# Patient Record
Sex: MALE | Race: WHITE | Employment: FULL TIME | URBAN - NONMETROPOLITAN AREA
[De-identification: names, ages, dates, MRNs, and addresses within clinical notes are randomized per-mention and may not be internally consistent; named-entity substitution may affect disease eponyms.]

---

## 2017-08-07 ENCOUNTER — OFFICE VISIT (OUTPATIENT)
Dept: FAMILY MEDICINE CLINIC | Age: 47
End: 2017-08-07

## 2017-08-07 VITALS
TEMPERATURE: 97.2 F | OXYGEN SATURATION: 98 % | WEIGHT: 174 LBS | BODY MASS INDEX: 24.91 KG/M2 | HEART RATE: 83 BPM | HEIGHT: 70 IN | DIASTOLIC BLOOD PRESSURE: 102 MMHG | SYSTOLIC BLOOD PRESSURE: 146 MMHG

## 2017-08-07 DIAGNOSIS — F41.9 ANXIETY: Primary | ICD-10-CM

## 2017-08-07 DIAGNOSIS — I10 ESSENTIAL (PRIMARY) HYPERTENSION: ICD-10-CM

## 2017-08-07 DIAGNOSIS — Z91.030 ALLERGY TO BEE STING: ICD-10-CM

## 2017-08-07 DIAGNOSIS — F51.04 PSYCHOPHYSIOLOGICAL INSOMNIA: ICD-10-CM

## 2017-08-07 PROBLEM — G25.81 RESTLESS LEG: Status: ACTIVE | Noted: 2017-06-12

## 2017-08-07 PROBLEM — M54.50 ACUTE LOW BACK PAIN: Status: ACTIVE | Noted: 2017-07-11

## 2017-08-07 PROCEDURE — 99203 OFFICE O/P NEW LOW 30 MIN: CPT | Performed by: NURSE PRACTITIONER

## 2017-08-07 RX ORDER — CLONAZEPAM 1 MG/1
1 TABLET ORAL
Qty: 60 TABLET | Status: CANCELLED | OUTPATIENT
Start: 2017-08-07

## 2017-08-07 RX ORDER — LOSARTAN POTASSIUM 50 MG/1
50 TABLET ORAL DAILY
Qty: 30 TABLET | Refills: 3 | Status: SHIPPED | OUTPATIENT
Start: 2017-08-07 | End: 2017-12-11 | Stop reason: DRUGHIGH

## 2017-08-07 RX ORDER — PROPRANOLOL HYDROCHLORIDE 80 MG/1
80 CAPSULE, EXTENDED RELEASE ORAL
COMMUNITY
Start: 2017-06-05 | End: 2017-08-07 | Stop reason: SDUPTHER

## 2017-08-07 RX ORDER — LOSARTAN POTASSIUM 50 MG/1
50 TABLET ORAL
COMMUNITY
Start: 2017-06-05 | End: 2017-08-07 | Stop reason: SDUPTHER

## 2017-08-07 RX ORDER — PROPRANOLOL HYDROCHLORIDE 80 MG/1
80 CAPSULE, EXTENDED RELEASE ORAL DAILY
Qty: 30 CAPSULE | Refills: 3 | Status: SHIPPED | OUTPATIENT
Start: 2017-08-07

## 2017-08-07 RX ORDER — ESZOPICLONE 3 MG/1
3 TABLET, FILM COATED ORAL NIGHTLY PRN
Qty: 30 TABLET | Refills: 0 | Status: SHIPPED | OUTPATIENT
Start: 2017-08-07 | End: 2017-09-01 | Stop reason: SDUPTHER

## 2017-08-07 RX ORDER — CLONAZEPAM 1 MG/1
1 TABLET ORAL
COMMUNITY
Start: 2017-07-05 | End: 2017-08-14 | Stop reason: SDUPTHER

## 2017-08-07 RX ORDER — EPINEPHRINE 0.3 MG/.3ML
0.3 INJECTION SUBCUTANEOUS PRN
COMMUNITY
End: 2017-08-07 | Stop reason: SDUPTHER

## 2017-08-07 RX ORDER — EPINEPHRINE 0.3 MG/.3ML
0.3 INJECTION SUBCUTANEOUS PRN
Qty: 3 EACH | Refills: 0 | Status: SHIPPED | OUTPATIENT
Start: 2017-08-07

## 2017-08-07 ASSESSMENT — ENCOUNTER SYMPTOMS
EYE DISCHARGE: 0
SHORTNESS OF BREATH: 0
COUGH: 0
PHOTOPHOBIA: 1

## 2017-08-14 RX ORDER — CLONAZEPAM 1 MG/1
1 TABLET ORAL 2 TIMES DAILY PRN
Qty: 60 TABLET | Refills: 0 | Status: SHIPPED | OUTPATIENT
Start: 2017-08-14 | End: 2017-09-18 | Stop reason: SDUPTHER

## 2017-09-01 DIAGNOSIS — F51.04 PSYCHOPHYSIOLOGICAL INSOMNIA: ICD-10-CM

## 2017-09-05 RX ORDER — ESZOPICLONE 3 MG/1
3 TABLET, FILM COATED ORAL NIGHTLY PRN
Qty: 30 TABLET | Refills: 0 | Status: SHIPPED | OUTPATIENT
Start: 2017-09-05 | End: 2017-10-06 | Stop reason: SDUPTHER

## 2017-09-11 ENCOUNTER — OFFICE VISIT (OUTPATIENT)
Dept: FAMILY MEDICINE CLINIC | Age: 47
End: 2017-09-11

## 2017-09-11 VITALS
BODY MASS INDEX: 23.66 KG/M2 | DIASTOLIC BLOOD PRESSURE: 80 MMHG | OXYGEN SATURATION: 99 % | HEART RATE: 74 BPM | SYSTOLIC BLOOD PRESSURE: 132 MMHG | WEIGHT: 169 LBS | HEIGHT: 71 IN | TEMPERATURE: 97.7 F

## 2017-09-11 DIAGNOSIS — F41.9 ANXIETY: ICD-10-CM

## 2017-09-11 DIAGNOSIS — I10 ESSENTIAL (PRIMARY) HYPERTENSION: ICD-10-CM

## 2017-09-11 DIAGNOSIS — H93.13 TINNITUS, BILATERAL: Primary | ICD-10-CM

## 2017-09-11 PROCEDURE — 99213 OFFICE O/P EST LOW 20 MIN: CPT | Performed by: NURSE PRACTITIONER

## 2017-09-11 ASSESSMENT — ENCOUNTER SYMPTOMS
COUGH: 0
SHORTNESS OF BREATH: 0

## 2017-09-18 DIAGNOSIS — F41.9 ANXIETY: Primary | ICD-10-CM

## 2017-09-18 RX ORDER — CLONAZEPAM 1 MG/1
1 TABLET ORAL 2 TIMES DAILY PRN
Qty: 60 TABLET | Refills: 0 | Status: SHIPPED | OUTPATIENT
Start: 2017-09-18 | End: 2017-10-10 | Stop reason: SDUPTHER

## 2017-09-23 ENCOUNTER — HOSPITAL ENCOUNTER (OUTPATIENT)
Dept: GENERAL RADIOLOGY | Age: 47
Discharge: HOME | End: 2017-09-23
Payer: COMMERCIAL

## 2017-09-23 DIAGNOSIS — R05.9 COUGH: ICD-10-CM

## 2017-09-23 PROCEDURE — 71020 XR CHEST STANDARD TWO VW: CPT

## 2017-10-06 DIAGNOSIS — F51.04 PSYCHOPHYSIOLOGICAL INSOMNIA: ICD-10-CM

## 2017-10-06 RX ORDER — ESZOPICLONE 3 MG/1
3 TABLET, FILM COATED ORAL NIGHTLY PRN
Qty: 30 TABLET | Refills: 0 | Status: SHIPPED | OUTPATIENT
Start: 2017-10-06 | End: 2017-11-03 | Stop reason: SDUPTHER

## 2017-10-10 DIAGNOSIS — F41.9 ANXIETY: ICD-10-CM

## 2017-10-10 RX ORDER — CLONAZEPAM 1 MG/1
1 TABLET ORAL 2 TIMES DAILY PRN
Qty: 60 TABLET | Refills: 0 | Status: SHIPPED | OUTPATIENT
Start: 2017-10-10 | End: 2017-11-10 | Stop reason: SDUPTHER

## 2017-10-10 NOTE — TELEPHONE ENCOUNTER
Controlled Substances Monitoring:     Attestation: The Prescription Monitoring Report for this patient was reviewed today. (Lucio Sandoval, CNP)  Documentation: No signs of potential drug abuse or diversion identified. (Lucio Sandoval, CNP)    Remind patient that I will only provide with 1 more fill after this one. Needs to see ENT or psych for further fills.

## 2017-11-03 DIAGNOSIS — F51.04 PSYCHOPHYSIOLOGICAL INSOMNIA: ICD-10-CM

## 2017-11-03 RX ORDER — ESZOPICLONE 3 MG/1
3 TABLET, FILM COATED ORAL NIGHTLY PRN
Qty: 30 TABLET | Refills: 0 | Status: SHIPPED | OUTPATIENT
Start: 2017-11-03 | End: 2017-12-04 | Stop reason: SDUPTHER

## 2017-11-10 DIAGNOSIS — F41.9 ANXIETY: ICD-10-CM

## 2017-11-10 RX ORDER — CLONAZEPAM 1 MG/1
1 TABLET ORAL 2 TIMES DAILY PRN
Qty: 60 TABLET | Refills: 0 | Status: SHIPPED | OUTPATIENT
Start: 2017-11-10

## 2017-11-10 NOTE — TELEPHONE ENCOUNTER
Ordered. As per our previous discussions, this will be the last prescription refill I provide for the klonopin as he was referred to ENT to receive further refills of this.

## 2017-11-22 ENCOUNTER — OFFICE VISIT (OUTPATIENT)
Dept: FAMILY MEDICINE CLINIC | Age: 47
End: 2017-11-22

## 2017-11-22 VITALS
HEIGHT: 71 IN | TEMPERATURE: 97.4 F | BODY MASS INDEX: 23.62 KG/M2 | SYSTOLIC BLOOD PRESSURE: 130 MMHG | DIASTOLIC BLOOD PRESSURE: 120 MMHG | HEART RATE: 106 BPM | OXYGEN SATURATION: 96 % | WEIGHT: 168.7 LBS

## 2017-11-22 DIAGNOSIS — Z98.890 HISTORY OF BACK SURGERY: ICD-10-CM

## 2017-11-22 DIAGNOSIS — M54.41 ACUTE RIGHT-SIDED LOW BACK PAIN WITH RIGHT-SIDED SCIATICA: Primary | ICD-10-CM

## 2017-11-22 DIAGNOSIS — I10 ESSENTIAL (PRIMARY) HYPERTENSION: ICD-10-CM

## 2017-11-22 PROCEDURE — 96372 THER/PROPH/DIAG INJ SC/IM: CPT | Performed by: NURSE PRACTITIONER

## 2017-11-22 PROCEDURE — 99213 OFFICE O/P EST LOW 20 MIN: CPT | Performed by: NURSE PRACTITIONER

## 2017-11-22 RX ORDER — ETODOLAC 400 MG/1
TABLET, FILM COATED ORAL
Refills: 0 | COMMUNITY
Start: 2017-11-15 | End: 2017-11-22 | Stop reason: SINTOL

## 2017-11-22 RX ORDER — CYCLOBENZAPRINE HCL 10 MG
10 TABLET ORAL 3 TIMES DAILY PRN
Qty: 30 TABLET | Refills: 1 | Status: SHIPPED | OUTPATIENT
Start: 2017-11-22 | End: 2017-12-02

## 2017-11-22 RX ORDER — HYDROCODONE BITARTRATE AND ACETAMINOPHEN 5; 325 MG/1; MG/1
1 TABLET ORAL EVERY 8 HOURS PRN
Qty: 21 TABLET | Refills: 0 | Status: SHIPPED | OUTPATIENT
Start: 2017-11-22 | End: 2017-11-29

## 2017-11-22 ASSESSMENT — ENCOUNTER SYMPTOMS: BACK PAIN: 1

## 2017-11-22 NOTE — PROGRESS NOTES
Anxiety . 60 tablet 0    eszopiclone (ESZOPICLONE) 3 MG TABS Take 1 tablet by mouth nightly as needed (sleep) 30 tablet 0    albuterol sulfate (PROAIR RESPICLICK) 705 (90 Base) MCG/ACT aerosol powder inhalation Inhale 1 puff into the lungs every 4 hours as needed for Wheezing or Shortness of Breath 1 Inhaler 3    EPINEPHrine (EPIPEN) 0.3 MG/0.3ML SOAJ injection Inject 0.3 mLs into the muscle as needed (allergic reaction) Use as directed for allergic reaction 3 each 0    losartan (COZAAR) 50 MG tablet Take 1 tablet by mouth daily 30 tablet 3    propranolol (INDERAL LA) 80 MG extended release capsule Take 1 capsule by mouth daily 30 capsule 3     No current facility-administered medications on file prior to visit. Allergies   Allergen Reactions    Bee Venom Anaphylaxis       Review of Systems   Constitutional: Negative for chills and fever. Musculoskeletal: Positive for back pain. Skin: Negative for rash. Neurological: Positive for numbness (R leg). Negative for dizziness, weakness and light-headedness. Objective  Vitals:    11/22/17 1700 11/22/17 1738   BP: (!) 160/120 (!) 130/120   Site: Right Arm Right Arm   Position: Standing Sitting   Cuff Size: Medium Adult Medium Adult   Pulse: 106    Temp: 97.4 °F (36.3 °C)    SpO2: 96%    Weight: 168 lb 11.2 oz (76.5 kg)    Height: 5' 11\" (1.803 m)      Physical Exam   Constitutional: He is oriented to person, place, and time. He appears well-developed and well-nourished. HENT:   Head: Normocephalic and atraumatic. Cardiovascular: Regular rhythm. Tachycardia present. Musculoskeletal:        Lumbar back: He exhibits pain (R lower back). Using a cane   Neurological: He is alert and oriented to person, place, and time. Assessment & Plan    1.  Acute right-sided low back pain with right-sided sciatica  ketorolac (TORADOL) injection 30 mg    cyclobenzaprine (FLEXERIL) 10 MG tablet    HYDROcodone-acetaminophen (NORCO) 5-325 MG per tablet

## 2017-11-27 ENCOUNTER — OFFICE VISIT (OUTPATIENT)
Dept: FAMILY MEDICINE CLINIC | Age: 47
End: 2017-11-27

## 2017-11-27 ENCOUNTER — HOSPITAL ENCOUNTER (EMERGENCY)
Age: 47
Discharge: HOME | End: 2017-11-27
Payer: COMMERCIAL

## 2017-11-27 VITALS
HEART RATE: 102 BPM | WEIGHT: 167 LBS | HEIGHT: 71 IN | BODY MASS INDEX: 23.38 KG/M2 | OXYGEN SATURATION: 96 % | TEMPERATURE: 95.8 F | SYSTOLIC BLOOD PRESSURE: 160 MMHG | DIASTOLIC BLOOD PRESSURE: 105 MMHG

## 2017-11-27 VITALS
RESPIRATION RATE: 18 BRPM | DIASTOLIC BLOOD PRESSURE: 100 MMHG | OXYGEN SATURATION: 99 % | WEIGHT: 166 LBS | BODY MASS INDEX: 23.24 KG/M2 | TEMPERATURE: 98.4 F | HEIGHT: 71 IN | HEART RATE: 102 BPM | SYSTOLIC BLOOD PRESSURE: 150 MMHG

## 2017-11-27 DIAGNOSIS — M54.41 ACUTE RIGHT-SIDED LOW BACK PAIN WITH RIGHT-SIDED SCIATICA: Primary | ICD-10-CM

## 2017-11-27 DIAGNOSIS — M54.50 ACUTE EXACERBATION OF CHRONIC LOW BACK PAIN: Primary | ICD-10-CM

## 2017-11-27 DIAGNOSIS — G89.29 ACUTE EXACERBATION OF CHRONIC LOW BACK PAIN: Primary | ICD-10-CM

## 2017-11-27 PROCEDURE — 99213 OFFICE O/P EST LOW 20 MIN: CPT | Performed by: NURSE PRACTITIONER

## 2017-11-27 PROCEDURE — 99282 EMERGENCY DEPT VISIT SF MDM: CPT

## 2017-11-27 RX ORDER — METHYLPREDNISOLONE 4 MG/1
TABLET ORAL
Qty: 1 KIT | Refills: 0 | Status: SHIPPED | OUTPATIENT
Start: 2017-11-27 | End: 2017-12-03

## 2017-11-27 RX ORDER — PREDNISONE 20 MG/1
20 TABLET ORAL 2 TIMES DAILY
Qty: 10 TABLET | Refills: 0 | Status: CANCELLED | OUTPATIENT
Start: 2017-11-27 | End: 2017-12-02

## 2017-11-27 RX ORDER — MELOXICAM 15 MG/1
15 TABLET ORAL DAILY
Qty: 15 TABLET | Refills: 0 | Status: SHIPPED | OUTPATIENT
Start: 2017-11-27 | End: 2017-12-29 | Stop reason: ALTCHOICE

## 2017-11-27 ASSESSMENT — ENCOUNTER SYMPTOMS
SHORTNESS OF BREATH: 0
SHORTNESS OF BREATH: 0
BACK PAIN: 1
VOMITING: 0
ABDOMINAL PAIN: 0
COUGH: 0
DIARRHEA: 0
NAUSEA: 0
COUGH: 0
BACK PAIN: 1

## 2017-11-27 ASSESSMENT — PAIN DESCRIPTION - LOCATION: LOCATION: BACK;LEG

## 2017-11-27 ASSESSMENT — PAIN DESCRIPTION - DESCRIPTORS: DESCRIPTORS: SHARP

## 2017-11-27 ASSESSMENT — PAIN DESCRIPTION - FREQUENCY: FREQUENCY: CONTINUOUS

## 2017-11-27 ASSESSMENT — PAIN SCALES - GENERAL: PAINLEVEL_OUTOF10: 10

## 2017-11-27 ASSESSMENT — PAIN DESCRIPTION - PAIN TYPE: TYPE: ACUTE PAIN

## 2017-11-27 ASSESSMENT — PAIN DESCRIPTION - ORIENTATION: ORIENTATION: LOWER;LEFT

## 2017-11-27 NOTE — PROGRESS NOTES
Subjective  Chief Complaint   Patient presents with   Veronica Noble     pt states that he is here for a check up for back pain. states that he is no better that he is actually a little worse. would like more pain medication if possible. Back Pain   This is a recurrent problem. Episode onset: on and off for years, has progressively been getting worse over the past week. The problem occurs constantly. The problem has been gradually worsening since onset. The pain is present in the lumbar spine and gluteal. The quality of the pain is described as burning. The pain radiates to the right knee, right foot and right thigh. The pain is at a severity of 10/10. The pain is severe. The symptoms are aggravated by sitting. Pertinent negatives include no chest pain or fever. Pt standing in room during entire visit moaning and grimacing in pain. States his pain is significantly worse than last week when he was treated  By Yumiko Kevin. Blood pressure is again elevated. Pt is using cane and is unable to sit d/t the pain.     Patient Active Problem List    Diagnosis Date Noted    Acute low back pain 07/11/2017    Psychophysiological insomnia 06/12/2017    Restless leg 06/12/2017    Anxiety 06/05/2017    Essential (primary) hypertension 06/05/2017     Past Medical History:   Diagnosis Date    Anxiety     Chronic back pain     Hypertension     Insomnia     Restless leg 6/12/2017     Past Surgical History:   Procedure Laterality Date    APPENDECTOMY      CHOLECYSTECTOMY      TONSILLECTOMY       Family History   Problem Relation Age of Onset    Heart Disease Father     Other Father      heart bypass     Cancer Maternal Grandmother      stomach    Heart Attack Paternal Grandfather      Social History     Social History    Marital status:      Spouse name: N/A    Number of children: N/A    Years of education: N/A     Social History Main Topics    Smoking status: Former Smoker    Smokeless tobacco: Never Used  Alcohol use Yes      Comment: occasional    Drug use: No    Sexual activity: Not Asked     Other Topics Concern    None     Social History Narrative    None     Current Outpatient Prescriptions on File Prior to Visit   Medication Sig Dispense Refill    cyclobenzaprine (FLEXERIL) 10 MG tablet Take 1 tablet by mouth 3 times daily as needed for Muscle spasms 30 tablet 1    HYDROcodone-acetaminophen (NORCO) 5-325 MG per tablet Take 1 tablet by mouth every 8 hours as needed for Pain . 21 tablet 0    clonazePAM (KLONOPIN) 1 MG tablet Take 1 tablet by mouth 2 times daily as needed for Anxiety . 60 tablet 0    eszopiclone (ESZOPICLONE) 3 MG TABS Take 1 tablet by mouth nightly as needed (sleep) 30 tablet 0    albuterol sulfate (PROAIR RESPICLICK) 774 (90 Base) MCG/ACT aerosol powder inhalation Inhale 1 puff into the lungs every 4 hours as needed for Wheezing or Shortness of Breath 1 Inhaler 3    EPINEPHrine (EPIPEN) 0.3 MG/0.3ML SOAJ injection Inject 0.3 mLs into the muscle as needed (allergic reaction) Use as directed for allergic reaction 3 each 0    losartan (COZAAR) 50 MG tablet Take 1 tablet by mouth daily 30 tablet 3    propranolol (INDERAL LA) 80 MG extended release capsule Take 1 capsule by mouth daily 30 capsule 3     No current facility-administered medications on file prior to visit. Allergies   Allergen Reactions    Bee Venom Anaphylaxis       Review of Systems   Constitutional: Negative for chills, diaphoresis, fatigue and fever. Respiratory: Negative for cough and shortness of breath. Cardiovascular: Negative for chest pain, palpitations and leg swelling. Musculoskeletal: Positive for back pain.        Objective  Vitals:    11/27/17 1748 11/27/17 1811   BP: (!) 139/121 (!) 160/105   Pulse: 102    Temp: 95.8 °F (35.4 °C)    TempSrc: Tympanic    SpO2: 96%    Weight: 167 lb (75.8 kg)    Height: 5' 11\" (1.803 m)      Physical Exam   Constitutional: He is oriented to person, place, and time. He appears well-developed and well-nourished. He appears distressed. Pt moaning and grimacing and panting during conversation in pain. Walking with cane with right leg locked at the knee. Cardiovascular: Normal rate, regular rhythm and normal heart sounds. Pulmonary/Chest: Effort normal and breath sounds normal.   Musculoskeletal:        Lumbar back: He exhibits decreased range of motion, tenderness, bony tenderness, pain and spasm. He exhibits no swelling, no edema, no deformity and no laceration. Neurological: He is alert and oriented to person, place, and time. Skin: Skin is warm and dry. No rash noted. He is not diaphoretic. No erythema. No pallor. Psychiatric: His behavior is normal. Judgment and thought content normal. His mood appears anxious. Assessment & Plan    1. Acute right-sided low back pain with right-sided sciatica       Had discussion with patient regarding severe degree of pain. Discussed that I will not prescribe any more controlled substance pain medications d/t him still having active prescription of norco. Offered prednisone burst for inflammation that I recommend he take in addition to norco he currently has. Alternative option of ER discussed d/t the severe amount of pain he is in. Pt called his wife and discussed situation with her and decided that he would go to ER d/t his extreme level of pain. No orders of the defined types were placed in this encounter. No orders of the defined types were placed in this encounter. Return if symptoms worsen or fail to improve.     Jadyn Mason, CNP

## 2017-11-28 ENCOUNTER — TELEPHONE (OUTPATIENT)
Dept: FAMILY MEDICINE CLINIC | Age: 47
End: 2017-11-28

## 2017-11-28 DIAGNOSIS — M54.41 ACUTE RIGHT-SIDED LOW BACK PAIN WITH RIGHT-SIDED SCIATICA: Primary | ICD-10-CM

## 2017-11-28 NOTE — TELEPHONE ENCOUNTER
Please call the pt back at [phone]. In ref to severe back pain. ER did not do anything for him.

## 2017-11-28 NOTE — ED PROVIDER NOTES
[address] ED  eMERGENCY dEPARTMENT eNCOUnter      Pt Name: Gurwinder Coley  MRN: [id number]  [place] 1970  Date of evaluation: 11/27/2017  Provider: Elias Neville PA-C      HISTORY OF PRESENT ILLNESS    Gurwinder Coley is a 41 y.o. male who presents to the Emergency Department with Acute exacerbation of chronic back pain. Patient states she has had right-sided back pain that radiates into his right leg exacerbated for the past week. He seen his primary care physician twice for this. Patient states that today his primary care 7 there is nothing more that they could do for him and to go to the emergency department. No report was called from this office. When I tried to call the office they were closed. Patient denies any new symptoms. Patient states that he is physically going to pain management. Patient is taking Norco and Flexeril. He took both of these medications this morning. He is not taking any steroids or anti-inflammatories. He denies any numbness, tingling, incontinence of bowel or bladder or saddle paresthesia. Denies injury or trauma. REVIEW OF SYSTEMS       Review of Systems   Constitutional: Negative for chills, diaphoresis, fatigue and fever. HENT: Negative for congestion. Respiratory: Negative for cough and shortness of breath. Cardiovascular: Negative for chest pain and palpitations. Gastrointestinal: Negative for abdominal pain, diarrhea, nausea and vomiting. Genitourinary: Negative for dysuria and flank pain. Musculoskeletal: Positive for back pain. Skin: Negative for rash. Neurological: Negative for dizziness, light-headedness and headaches.          PAST MEDICAL HISTORY     Past Medical History:   Diagnosis Date    Anxiety     Chronic back pain     Hypertension     Insomnia     Restless leg 6/12/2017         SURGICAL HISTORY       Past Surgical History:   Procedure Laterality Date    APPENDECTOMY      CHOLECYSTECTOMY      TONSILLECTOMY           CURRENT MEDICATIONS       Previous Medications    ALBUTEROL SULFATE (PROAIR RESPICLICK) 736 (90 BASE) MCG/ACT AEROSOL POWDER INHALATION    Inhale 1 puff into the lungs every 4 hours as needed for Wheezing or Shortness of Breath    CLONAZEPAM (KLONOPIN) 1 MG TABLET    Take 1 tablet by mouth 2 times daily as needed for Anxiety . CYCLOBENZAPRINE (FLEXERIL) 10 MG TABLET    Take 1 tablet by mouth 3 times daily as needed for Muscle spasms    EPINEPHRINE (EPIPEN) 0.3 MG/0.3ML SOAJ INJECTION    Inject 0.3 mLs into the muscle as needed (allergic reaction) Use as directed for allergic reaction    ESZOPICLONE (ESZOPICLONE) 3 MG TABS    Take 1 tablet by mouth nightly as needed (sleep)    HYDROCODONE-ACETAMINOPHEN (NORCO) 5-325 MG PER TABLET    Take 1 tablet by mouth every 8 hours as needed for Pain .     LOSARTAN (COZAAR) 50 MG TABLET    Take 1 tablet by mouth daily    PROPRANOLOL (INDERAL LA) 80 MG EXTENDED RELEASE CAPSULE    Take 1 capsule by mouth daily       ALLERGIES     Bee venom    FAMILY HISTORY       Family History   Problem Relation Age of Onset    Heart Disease Father     Other Father      heart bypass     Cancer Maternal Grandmother      stomach    Heart Attack Paternal Grandfather           SOCIAL HISTORY       Social History     Social History    Marital status:      Spouse name: N/A    Number of children: N/A    Years of education: N/A     Social History Main Topics    Smoking status: Former Smoker    Smokeless tobacco: Never Used    Alcohol use Yes      Comment: occasional    Drug use: No    Sexual activity: Not Asked     Other Topics Concern    None     Social History Narrative    None       SCREENINGS             PHYSICAL EXAM    (up to 7 for level 4, 8 or more for level 5)     ED Triage Vitals   BP Temp Temp Source Pulse Resp SpO2 Height Weight   11/27/17 1922 11/27/17 1914 11/27/17 1914 11/27/17 1914 11/27/17 1914 11/27/17 1914 11/27/17 1914 11/27/17 1914   (!)

## 2017-11-29 ENCOUNTER — TELEPHONE (OUTPATIENT)
Dept: FAMILY MEDICINE CLINIC | Age: 47
End: 2017-11-29

## 2017-11-29 NOTE — TELEPHONE ENCOUNTER
ER gave pt steroids for back pain and they're working and now that he started that, his pain medication is working. Pt is almost out of medication and was wondering if you could submit more for him, for a few more days, until his back feels completely better? Please advise pt. [address]

## 2017-12-04 DIAGNOSIS — F51.04 PSYCHOPHYSIOLOGICAL INSOMNIA: ICD-10-CM

## 2017-12-05 RX ORDER — ESZOPICLONE 3 MG/1
3 TABLET, FILM COATED ORAL NIGHTLY PRN
Qty: 30 TABLET | Refills: 0 | Status: SHIPPED | OUTPATIENT
Start: 2017-12-05 | End: 2022-09-12 | Stop reason: ALTCHOICE

## 2017-12-11 ENCOUNTER — OFFICE VISIT (OUTPATIENT)
Dept: FAMILY MEDICINE CLINIC | Age: 47
End: 2017-12-11

## 2017-12-11 VITALS
HEART RATE: 76 BPM | HEIGHT: 71 IN | WEIGHT: 170 LBS | SYSTOLIC BLOOD PRESSURE: 152 MMHG | TEMPERATURE: 97.4 F | OXYGEN SATURATION: 98 % | BODY MASS INDEX: 23.8 KG/M2 | DIASTOLIC BLOOD PRESSURE: 110 MMHG

## 2017-12-11 DIAGNOSIS — I10 ESSENTIAL (PRIMARY) HYPERTENSION: Primary | ICD-10-CM

## 2017-12-11 PROCEDURE — 99213 OFFICE O/P EST LOW 20 MIN: CPT | Performed by: NURSE PRACTITIONER

## 2017-12-11 RX ORDER — LOSARTAN POTASSIUM 100 MG/1
100 TABLET ORAL DAILY
Qty: 30 TABLET | Refills: 3 | Status: SHIPPED | OUTPATIENT
Start: 2017-12-11

## 2017-12-11 ASSESSMENT — PATIENT HEALTH QUESTIONNAIRE - PHQ9
1. LITTLE INTEREST OR PLEASURE IN DOING THINGS: 0
SUM OF ALL RESPONSES TO PHQ9 QUESTIONS 1 & 2: 0
2. FEELING DOWN, DEPRESSED OR HOPELESS: 0
SUM OF ALL RESPONSES TO PHQ QUESTIONS 1-9: 0

## 2017-12-11 ASSESSMENT — ENCOUNTER SYMPTOMS
COUGH: 0
SHORTNESS OF BREATH: 0

## 2017-12-11 NOTE — PROGRESS NOTES
4 hours as needed for Wheezing or Shortness of Breath 1 Inhaler 3    EPINEPHrine (EPIPEN) 0.3 MG/0.3ML SOAJ injection Inject 0.3 mLs into the muscle as needed (allergic reaction) Use as directed for allergic reaction 3 each 0    propranolol (INDERAL LA) 80 MG extended release capsule Take 1 capsule by mouth daily 30 capsule 3     No current facility-administered medications on file prior to visit. Allergies   Allergen Reactions    Bee Venom Anaphylaxis       Review of Systems   Constitutional: Negative for chills, diaphoresis, fatigue and fever. Respiratory: Negative for cough and shortness of breath. Cardiovascular: Negative for chest pain, palpitations and leg swelling. Objective  Vitals:    12/11/17 1623 12/11/17 1628   BP: (!) 150/108 (!) 152/110   Pulse: 76    Temp: 97.4 °F (36.3 °C)    TempSrc: Tympanic    SpO2: 98%    Weight: 170 lb (77.1 kg)    Height: 5' 11\" (1.803 m)      Physical Exam   Constitutional: He is oriented to person, place, and time. He appears well-developed and well-nourished. No distress. Cardiovascular: Normal rate, regular rhythm and normal heart sounds. Pulmonary/Chest: Effort normal and breath sounds normal. No respiratory distress. Neurological: He is alert and oriented to person, place, and time. Skin: Skin is warm and dry. No rash noted. He is not diaphoretic. No erythema. No pallor. Psychiatric: He has a normal mood and affect. His behavior is normal. Judgment and thought content normal.     Assessment & Plan    1. Essential (primary) hypertension  losartan (COZAAR) 100 MG tablet     Increase losartan to 100 mg daily. F/u in 3 weeks to recheck. Side effects, adverse effects of the medication prescribed today, as well as treatment plan/ rationale and result expectations have been discussed with the patient who expresses understanding and desires to proceed. Close follow up to evaluate treatment results and for coordination of care.   I have reviewed

## 2017-12-22 ENCOUNTER — TELEPHONE (OUTPATIENT)
Dept: FAMILY MEDICINE CLINIC | Age: 47
End: 2017-12-22

## 2017-12-22 NOTE — TELEPHONE ENCOUNTER
STAN-Pt of , Pt calling stating that he had a seizure last night about ten o'clock, and has slept through the night. He has never had a seizure in the past. Pt states he thinks it is associated with running out of his klonopin. He states he has not taken it for two days. Pt seemed to be very confused, advised him to go to the ER, pt states he is really not with it today. Advised pt to either call 911 or get someone to take him to the ER. Pt stated he was going to call his wife or a neighbor to take him to the ER.

## 2017-12-29 ENCOUNTER — OFFICE VISIT (OUTPATIENT)
Dept: FAMILY MEDICINE CLINIC | Age: 47
End: 2017-12-29

## 2017-12-29 VITALS
BODY MASS INDEX: 24.36 KG/M2 | HEART RATE: 60 BPM | DIASTOLIC BLOOD PRESSURE: 102 MMHG | SYSTOLIC BLOOD PRESSURE: 168 MMHG | HEIGHT: 71 IN | WEIGHT: 174 LBS | OXYGEN SATURATION: 96 %

## 2017-12-29 DIAGNOSIS — F41.9 ANXIETY: ICD-10-CM

## 2017-12-29 DIAGNOSIS — R56.9 SEIZURES (HCC): Primary | ICD-10-CM

## 2017-12-29 DIAGNOSIS — I10 ESSENTIAL (PRIMARY) HYPERTENSION: ICD-10-CM

## 2017-12-29 PROCEDURE — 99213 OFFICE O/P EST LOW 20 MIN: CPT | Performed by: NURSE PRACTITIONER

## 2017-12-29 RX ORDER — HYDROXYZINE PAMOATE 25 MG/1
25 CAPSULE ORAL 3 TIMES DAILY PRN
Qty: 30 CAPSULE | Refills: 0 | Status: SHIPPED | OUTPATIENT
Start: 2017-12-29 | End: 2018-01-08

## 2017-12-29 ASSESSMENT — ENCOUNTER SYMPTOMS
SHORTNESS OF BREATH: 0
COUGH: 0

## 2017-12-29 NOTE — PROGRESS NOTES
patient is nervous/anxious. Objective  Vitals:    12/29/17 1046 12/29/17 1051   BP: (!) 170/104 (!) 168/102   Pulse: 60    SpO2: 96%    Weight: 174 lb (78.9 kg)    Height: 5' 11\" (1.803 m)      Physical Exam   Constitutional: He is oriented to person, place, and time. He appears well-developed and well-nourished. No distress. HENT:   Head: Normocephalic and atraumatic. Right Ear: Tympanic membrane, external ear and ear canal normal.   Left Ear: Tympanic membrane, external ear and ear canal normal.   Nose: Nose normal.   Mouth/Throat: Oropharynx is clear and moist. No oropharyngeal exudate. Eyes: Conjunctivae are normal. Pupils are equal, round, and reactive to light. Neck: Normal range of motion. Neck supple. Cardiovascular: Normal rate, regular rhythm and normal heart sounds. Pulmonary/Chest: Effort normal and breath sounds normal. No respiratory distress. Musculoskeletal: Normal range of motion. Lymphadenopathy:     He has no cervical adenopathy. Neurological: He is alert and oriented to person, place, and time. He has normal strength. He displays no atrophy and no tremor. No cranial nerve deficit or sensory deficit. He exhibits normal muscle tone. He displays a negative Romberg sign. He displays no seizure activity. Coordination and gait normal. GCS eye subscore is 4. GCS verbal subscore is 5. GCS motor subscore is 6. Skin: Skin is warm and dry. No rash noted. He is not diaphoretic. No erythema. No pallor. Psychiatric: He has a normal mood and affect. His behavior is normal. Judgment and thought content normal.     Assessment & Plan    1. Seizures ([address]  Amb External Referral To Neurology   2. Anxiety  Referral To Unknown External Psychiatry    hydrOXYzine (VISTARIL) 25 MG capsule   3. Essential (primary) hypertension       Referral to neurology as ordered. Discussed not driving at least 6 months after diagnosis of seizure. Referral to psych. Vistaril PRN for anxiety.   Very suspicious history regarding controlled substances. Discussed that since it has been over 2 weeks since he has had any klonopin, I will not be restarting this for him. He will need to see psychiatry moving forward. After he left today, we actually did talk to [facility] who had no record of the patient being there on the reported dates. He has not been seen there since  July. Pt will continue to monitor bp at home and follow up if it remains elevated above 140/85. Side effects, adverse effects of the medication prescribed today, as well as treatment plan/ rationale and result expectations have been discussed with the patient who expresses understanding and desires to proceed. Close follow up to evaluate treatment results and for coordination of care. I have reviewed the patient's medical history in detail and updated the computerized patient record. As always, patient is advised that if symptoms worsen in any way they will proceed to the nearest emergency room. Orders Placed This Encounter   Procedures    Referral To Unknown External Psychiatry     Referral Priority:   Routine     Referral Type:   Consult for Advice and Opinion     Requested Specialty:   Psychiatry     Number of Visits Requested:   1    Amb External Referral To Neurology     Referral Priority:   Routine     Referral Type:   Consult for Advice and Opinion     Requested Specialty:   Neurology     Number of Visits Requested:   1       Orders Placed This Encounter   Medications    hydrOXYzine (VISTARIL) 25 MG capsule     Sig: Take 1 capsule by mouth 3 times daily as needed for Anxiety     Dispense:  30 capsule     Refill:  0       Return if symptoms worsen or fail to improve.     Nikunj Green, CNP

## 2018-01-11 ENCOUNTER — TELEPHONE (OUTPATIENT)
Dept: FAMILY MEDICINE CLINIC | Age: 48
End: 2018-01-11

## 2018-01-11 NOTE — TELEPHONE ENCOUNTER
Spoke pt he is aware of no narcotic meds.  Or psych meds will be prescribed     He just wants a blood test

## 2020-09-02 ENCOUNTER — OFFICE VISIT (OUTPATIENT)
Dept: FAMILY MEDICINE CLINIC | Age: 50
End: 2020-09-02
Payer: MEDICARE

## 2020-09-02 VITALS
HEART RATE: 106 BPM | WEIGHT: 168 LBS | TEMPERATURE: 97.8 F | BODY MASS INDEX: 23.52 KG/M2 | HEIGHT: 71 IN | DIASTOLIC BLOOD PRESSURE: 80 MMHG | OXYGEN SATURATION: 95 % | SYSTOLIC BLOOD PRESSURE: 118 MMHG

## 2020-09-02 PROCEDURE — 99214 OFFICE O/P EST MOD 30 MIN: CPT | Performed by: NURSE PRACTITIONER

## 2020-09-02 ASSESSMENT — ENCOUNTER SYMPTOMS
DIARRHEA: 0
FACIAL SWELLING: 1
NAUSEA: 0

## 2020-09-02 NOTE — PROGRESS NOTES
Subjective  Yuniel Hassan, 54 y.o. male presents today with:  Chief Complaint   Patient presents with    Dental Injury     cap broke off saw Denitist given etodolac wroked for first day, last night face started swelling, head throbiing , dentis states pull tooth on Friday        HPI  Presents to [facility] for facial swelling under his left eye & an intense headache   Tooth broke Friday & he began Amoxicillin and Etodolac for pain per Dr. Yoder Holding  States he hasn't missed antibiotic doses   Plan was to have his tooth possibly pulled once he was treated with atb  More sleepy and has been diaphoretic at times   No appetite   Drinking fluids \"when he can\"   Didn't take temperature at home        Past Medical History:   Diagnosis Date    Anxiety     Chronic back pain     Hypertension     Insomnia     Restless leg 6/12/2017      Past Surgical History:   Procedure Laterality Date    APPENDECTOMY      CHOLECYSTECTOMY      TONSILLECTOMY       Family History   Problem Relation Age of Onset    Heart Disease Father     Other Father         heart bypass     Cancer Maternal Grandmother         stomach    Heart Attack Paternal Grandfather        Review of Systems   Constitutional: Positive for activity change, appetite change, diaphoresis and fatigue. Negative for chills. HENT: Positive for dental problem and facial swelling. Gastrointestinal: Negative for diarrhea and nausea. Musculoskeletal: Negative for myalgias. Neurological: Positive for light-headedness and headaches. Negative for dizziness. PMH, Surgical Hx, Family Hx, and Social Hx reviewed and updated.       Objective  Vitals:    09/02/20 1632   BP: 118/80   Site: Left Upper Arm   Pulse: 106   Temp: 97.8 °F (36.6 °C)   SpO2: 95%   Weight: 168 lb (76.2 kg)   Height: 5' 11\" (1.803 m)     BP Readings from Last 3 Encounters:   09/02/20 118/80   12/29/17 (!) 168/102   12/11/17 (!) 152/110     Wt Readings from Last 3 Encounters:   09/02/20 168 lb (76.2 kg)   12/29/17 174 lb (78.9 kg)   12/11/17 170 lb (77.1 kg)         Physical Exam  Vitals signs reviewed. Constitutional:       Appearance: He is well-developed. He is ill-appearing. He is not diaphoretic. HENT:      Head: Normocephalic. Mouth/Throat:     Eyes:      General: Lids are normal.      Conjunctiva/sclera: Conjunctivae normal.   Neck:      Musculoskeletal: Normal range of motion. Cardiovascular:      Rate and Rhythm: Tachycardia present. Pulses: Normal pulses. Pulmonary:      Effort: Pulmonary effort is normal.   Skin:     General: Skin is warm and dry. Neurological:      Mental Status: He is alert and oriented to person, place, and time. Assessment & Plan    Diagnosis Orders   1. Dental abscess     2. Orbital swelling       No orders of the defined types were placed in this encounter. No orders of the defined types were placed in this encounter. Return for follow up with Dentistry as discussed     Discussed treatment options with Pawan Hurtado. Due to the facial swelling & dental infection on atb therapy, he was agreeable for ER evaluation with possible STAT imaging and lab work which is not available through the [address] [place] [address]. Pawan Hurtado was transported to [facility] ER by his wife. Pawan Hurtado was stable upon leaving the [address] [place] [address]. Close follow up to evaluate treatment results and for coordination of care. I have reviewed the patient's medical history in detail and updated the computerized patient record.     EVON Baron NP

## 2022-04-08 ENCOUNTER — OFFICE VISIT (OUTPATIENT)
Dept: ORTHOPEDIC SURGERY | Age: 52
End: 2022-04-08
Payer: MEDICARE

## 2022-04-08 ENCOUNTER — HOSPITAL ENCOUNTER (OUTPATIENT)
Dept: ORTHOPEDIC SURGERY | Age: 52
Discharge: HOME | End: 2022-04-10
Payer: MEDICARE

## 2022-04-08 VITALS
TEMPERATURE: 99 F | OXYGEN SATURATION: 95 % | HEIGHT: 71 IN | HEART RATE: 117 BPM | BODY MASS INDEX: 28 KG/M2 | WEIGHT: 200 LBS

## 2022-04-08 DIAGNOSIS — S82.141A FRACTURE, TIBIAL PLATEAU, RIGHT, CLOSED, INITIAL ENCOUNTER: ICD-10-CM

## 2022-04-08 DIAGNOSIS — S82.141A FRACTURE, TIBIAL PLATEAU, RIGHT, CLOSED, INITIAL ENCOUNTER: Primary | ICD-10-CM

## 2022-04-08 PROCEDURE — 73564 X-RAY EXAM KNEE 4 OR MORE: CPT

## 2022-04-08 PROCEDURE — 73564 X-RAY EXAM KNEE 4 OR MORE: CPT | Performed by: ORTHOPAEDIC SURGERY

## 2022-04-08 PROCEDURE — G8419 CALC BMI OUT NRM PARAM NOF/U: HCPCS | Performed by: ORTHOPAEDIC SURGERY

## 2022-04-08 PROCEDURE — 99204 OFFICE O/P NEW MOD 45 MIN: CPT | Performed by: ORTHOPAEDIC SURGERY

## 2022-04-08 PROCEDURE — L1830 KO IMMOB CANVAS LONG PRE OTS: HCPCS | Performed by: ORTHOPAEDIC SURGERY

## 2022-04-08 PROCEDURE — 1036F TOBACCO NON-USER: CPT | Performed by: ORTHOPAEDIC SURGERY

## 2022-04-08 PROCEDURE — G8427 DOCREV CUR MEDS BY ELIG CLIN: HCPCS | Performed by: ORTHOPAEDIC SURGERY

## 2022-04-08 ASSESSMENT — ENCOUNTER SYMPTOMS: BACK PAIN: 0

## 2022-04-08 NOTE — PROGRESS NOTES
Subjective:      Patient ID: Rodney Beltran is a 54 y.o. male who presents today for:  Chief Complaint   Patient presents with   [facility] Follow-up     Knee pain possible Fx. Pt states he fell on the ice a couple weeks go. States he fell directly on the knee. HPI  [address] 2 weeks ago and injured right foot and right knee. Went to [facility]. Saw a podiatrist and is treating for broken right foot. Presents today for right knee. Past Medical History:   Diagnosis Date    Anxiety     Chronic back pain     Hypertension     Insomnia     Restless leg 6/12/2017      Past Surgical History:   Procedure Laterality Date    APPENDECTOMY      CHOLECYSTECTOMY      TONSILLECTOMY       Social History     Socioeconomic History    Marital status:      Spouse name: Not on file    Number of children: Not on file    Years of education: Not on file    Highest education level: Not on file   Occupational History    Not on file   Tobacco Use    Smoking status: Former Smoker    Smokeless tobacco: Never Used   Substance and Sexual Activity    Alcohol use: Yes     Comment: occasional    Drug use: No    Sexual activity: Not on file   Other Topics Concern    Not on file   Social History Narrative    Not on file     Social Determinants of Health     Financial Resource Strain:     Difficulty of Paying Living Expenses: Not on file   Food Insecurity:     Worried About [address] in the Last Year: Not on file    [address] in the Last Year: Not on file   Transportation Needs:     Lack of Transportation (Medical): Not on file    Lack of Transportation (Non-Medical):  Not on file   Physical Activity:     Days of Exercise per Week: Not on file    Minutes of Exercise per Session: Not on file   Stress:     Feeling of Stress : Not on file   Social Connections:     Frequency of Communication with Friends and Family: Not on file    Frequency of Social Gatherings with Friends and Family: Not on file for leg swelling. Musculoskeletal: Negative for arthralgias, back pain, gait problem, joint swelling and neck pain. Skin: Negative for rash. Neurological: Negative for weakness and numbness. Objective:   Pulse 117   Temp 99 °F (37.2 °C) (Temporal)   Ht 5' 11\" (1.803 m)   Wt 200 lb (90.7 kg)   SpO2 95%   BMI 27.89 kg/m²     Right Knee Exam   Right knee exam is normal.    Muscle Strength   The patient has normal right knee strength. Tenderness   The patient is experiencing tenderness in the lateral joint line and medial joint line. Range of Motion   Extension: -5   Flexion: 80     Tests   Maykel:  Medial - negative Lateral - negative  Varus: negative Valgus: negative  Lachman:  Anterior - negative    Posterior - negative  Drawer:  Anterior - negative    Posterior - negative  Pivot shift: negative  Patellar apprehension: negative    Other   Erythema: absent  Scars: absent  Sensation: normal  Pulse: present  Swelling: none      Left Knee Exam   Left knee exam is normal.    Muscle Strength   The patient has normal left knee strength. Range of Motion   Extension: 0   Flexion: 140     Tests   Maykel:  Medial - negative Lateral - negative  Varus: negative Valgus: negative  Lachman:  Anterior - negative    Posterior - negative  Drawer:  Anterior - negative     Posterior - negative  Pivot shift: negative  Patellar apprehension: negative    Other   Erythema: absent  Scars: absent  Sensation: normal  Pulse: present  Swelling: none              Radiographs and Laboratory Studies:     Diagnostic Imaging Studies:    He had a report for the right knee that suggested lateral tibial plateau fracture. He did not bring a disc that can be downloaded in the PACS system. Therefore, AP, lateral and oblique x-rays of the right knee were obtained today. XR KNEE RIGHT (MIN 4 VIEWS)    Result Date: 4/8/2022  AP, lateral and oblique x-rays of the right knee were obtained today.   This demonstrates questionable lateral plateau fracture. Laboratory Studies:   No results found for: WBC, HGB, HCT, MCV, PLT  No results found for: SEDRATE  No results found for: CRP    Assessment:      Diagnosis Orders   1. Fracture, tibial plateau, right, closed, initial encounter  XR KNEE RIGHT (MIN 4 VIEWS)    MRI KNEE RIGHT WO CONTRAST    Ko immob canvas long pre ots          Plan: At this time he was placed in a knee immobilizer. He is to ice the knee. Continue to use the crutches. He is following up with the podiatrist regarding the right foot and ankle. We are going to obtain an MRI evaluation of the right knee to evaluate laterally the tibial plateau fracture the but but the potential for ligamentous or meniscus injury. He will follow-up after the MRI imaging. Orders Placed This Encounter   Procedures    XR KNEE RIGHT (MIN 4 VIEWS)     Standing Status:   Future     Number of Occurrences:   1     Standing Expiration Date:   4/8/2023     Scheduling Instructions:      AP weightbearing radiograph of bilateral knees with marker, lateral of right knee with marker, merchant of right knee, tunnel of right knee     Order Specific Question:   Reason for exam:     Answer:   Knee pain    MRI KNEE RIGHT WO CONTRAST     Standing Status:   Future     Standing Expiration Date:   4/8/2023     Scheduling Instructions:      MRI of right knee to assess for meniscal tear, loose osteochondral body, stress fracture     Order Specific Question:   Reason for exam:     Answer:   knee pain    Ko immob canvas long pre ots     Patient was prescribed a Breg Single Panel Knee Immob knee brace. The right knee will require stabilization / immobilization from this semi-rigid / rigid orthosis to improve their function. The orthosis will assist in protecting the affected area, provide functional support and facilitate healing.     The patient was educated and fit by a healthcare professional with expert knowledge and specialization in brace application while under the direct supervision of the physician. Verbal and written instructions for the use of and application of this item were provided. They were instructed to contact the office immediately should the brace result in increased pain, decreased sensation, increased swelling or worsening of the condition. No orders of the defined types were placed in this encounter. Return for MRI followup.       Dima Sullivan MD

## 2022-04-19 ENCOUNTER — HOSPITAL ENCOUNTER (OUTPATIENT)
Dept: MRI IMAGING | Age: 52
Discharge: HOME | End: 2022-04-21
Payer: MEDICARE

## 2022-04-19 DIAGNOSIS — S82.141A FRACTURE, TIBIAL PLATEAU, RIGHT, CLOSED, INITIAL ENCOUNTER: ICD-10-CM

## 2022-04-19 PROCEDURE — 73721 MRI JNT OF LWR EXTRE W/O DYE: CPT

## 2022-05-06 ENCOUNTER — HOSPITAL ENCOUNTER (OUTPATIENT)
Dept: MRI IMAGING | Age: 52
Discharge: HOME | End: 2022-05-08

## 2022-05-06 DIAGNOSIS — S82.141B TYPE I OR II OPEN FRACTURE OF RIGHT TIBIAL PLATEAU, INITIAL ENCOUNTER: ICD-10-CM

## 2022-05-17 ENCOUNTER — OFFICE VISIT (OUTPATIENT)
Dept: ORTHOPEDIC SURGERY | Age: 52
End: 2022-05-17
Payer: MEDICARE

## 2022-05-17 ENCOUNTER — TELEPHONE (OUTPATIENT)
Dept: ORTHOPEDIC SURGERY | Age: 52
End: 2022-05-17

## 2022-05-17 VITALS
TEMPERATURE: 97 F | HEART RATE: 117 BPM | OXYGEN SATURATION: 95 % | BODY MASS INDEX: 28 KG/M2 | WEIGHT: 200 LBS | HEIGHT: 71 IN

## 2022-05-17 DIAGNOSIS — S82.141A FRACTURE, TIBIAL PLATEAU, RIGHT, CLOSED, INITIAL ENCOUNTER: Primary | ICD-10-CM

## 2022-05-17 PROCEDURE — G8427 DOCREV CUR MEDS BY ELIG CLIN: HCPCS | Performed by: ORTHOPAEDIC SURGERY

## 2022-05-17 PROCEDURE — G8419 CALC BMI OUT NRM PARAM NOF/U: HCPCS | Performed by: ORTHOPAEDIC SURGERY

## 2022-05-17 PROCEDURE — 99213 OFFICE O/P EST LOW 20 MIN: CPT | Performed by: ORTHOPAEDIC SURGERY

## 2022-05-17 PROCEDURE — 1036F TOBACCO NON-USER: CPT | Performed by: ORTHOPAEDIC SURGERY

## 2022-05-17 NOTE — TELEPHONE ENCOUNTER
Workers comp case. according to Angelic Tiwari at [employer]. patient fell off ladder at work. [address]    Patient does not have claim #. This is a medical claim only. Use SS# as claim  #.      Please send all [employer] and C9 to   [facility]   Ph: [phone] ext [phone]  Fx: [phone]

## 2022-05-17 NOTE — PROGRESS NOTES
Subjective:      Patient ID: Roise Goldberg is a 54 y.o. male who presents today for:  Chief Complaint   Patient presents with    Knee Pain     The patient is a f/u for MRI of right knee from 4/19/22. The patient denies any injury or fall following the MRI. His pain today is rated as a 5/10 today. HPI  Patient has been using crutches. He did have his MRI evaluation. He describes improvement in his right knee pain. Past Medical History:   Diagnosis Date    Anxiety     Chronic back pain     Hypertension     Insomnia     Restless leg 6/12/2017      Past Surgical History:   Procedure Laterality Date    APPENDECTOMY      CHOLECYSTECTOMY      TONSILLECTOMY       Social History     Socioeconomic History    Marital status:      Spouse name: Not on file    Number of children: Not on file    Years of education: Not on file    Highest education level: Not on file   Occupational History    Not on file   Tobacco Use    Smoking status: Former Smoker    Smokeless tobacco: Never Used   Substance and Sexual Activity    Alcohol use: Yes     Comment: occasional    Drug use: No    Sexual activity: Not on file   Other Topics Concern    Not on file   Social History Narrative    Not on file     Social Determinants of Health     Financial Resource Strain:     Difficulty of Paying Living Expenses: Not on file   Food Insecurity:     Worried About [address] in the Last Year: Not on file    [address] in the Last Year: Not on file   Transportation Needs:     Lack of Transportation (Medical): Not on file    Lack of Transportation (Non-Medical):  Not on file   Physical Activity:     Days of Exercise per Week: Not on file    Minutes of Exercise per Session: Not on file   Stress:     Feeling of Stress : Not on file   Social Connections:     Frequency of Communication with Friends and Family: Not on file    Frequency of Social Gatherings with Friends and Family: Not on file   Fernie Garcia Attends Jainism Services: Not on file    Active Member of Clubs or Organizations: Not on file    Attends Club or Organization Meetings: Not on file    Marital Status: Not on file   Intimate Partner Violence:     Fear of Current or Ex-Partner: Not on file    Emotionally Abused: Not on file    Physically Abused: Not on file    Sexually Abused: Not on file   Housing Stability:     Unable to Pay for Housing in the Last Year: Not on file    Number of [place] in the Last Year: Not on file    Unstable Housing in the Last Year: Not on file     Family History   Problem Relation Age of Onset    Heart Disease Father     Other Father         heart bypass     Cancer Maternal Grandmother         stomach    Heart Attack Paternal Grandfather      Allergies   Allergen Reactions    Acetaminophen Shortness Of Breath    Bee Venom Anaphylaxis     Current Outpatient Medications on File Prior to Visit   Medication Sig Dispense Refill    losartan (COZAAR) 100 MG tablet Take 1 tablet by mouth daily 30 tablet 3    eszopiclone (ESZOPICLONE) 3 MG TABS Take 1 tablet by mouth nightly as needed (sleep) . 30 tablet 0    clonazePAM (KLONOPIN) 1 MG tablet Take 1 tablet by mouth 2 times daily as needed for Anxiety . 60 tablet 0    albuterol sulfate (PROAIR RESPICLICK) 751 (90 Base) MCG/ACT aerosol powder inhalation Inhale 1 puff into the lungs every 4 hours as needed for Wheezing or Shortness of Breath 1 Inhaler 3    EPINEPHrine (EPIPEN) 0.3 MG/0.3ML SOAJ injection Inject 0.3 mLs into the muscle as needed (allergic reaction) Use as directed for allergic reaction 3 each 0    propranolol (INDERAL LA) 80 MG extended release capsule Take 1 capsule by mouth daily 30 capsule 3     No current facility-administered medications on file prior to visit.          Review of Systems      Objective:   Pulse 117   Temp 97 °F (36.1 °C) (Temporal)   Ht 5' 11\" (1.803 m)   Wt 200 lb (90.7 kg)   SpO2 95%   BMI 27.89 kg/m²     Ortho Exam  He has trace effusion of the right knee. He is full extension of the knee. His extensor mechanism is strong and intact. He is flexion approximately 100 degrees. He has no calf pain. Negative Homans' sign. His neurovascular exam is intact. Radiographs and Laboratory Studies:     Diagnostic Imaging Studies:    I reviewed MRI images of the right knee from April 19, 2022. These demonstrate essentially nondisplaced lateral tibial plateau fracture. There is no evidence of associated meniscus tear. No evidence of ligamentous injury. Laboratory Studies:   No results found for: WBC, HGB, HCT, MCV, PLT  No results found for: SEDRATE  No results found for: CRP    Assessment:      Diagnosis Orders   1. Fracture, tibial plateau, right, closed, initial encounter  Ambulatory referral to Physical Therapy          Plan:     I reviewed these findings with the patient. At this time he is approximately 6 weeks postinjury. He can wean off the crutches. He can weight-bear as tolerated. Were to start him in a physical therapy protocol to work on range of motion, strengthening and gait training. He has follow-up in 6 to 8 weeks. We will repeat clinical exam at that time. Orders Placed This Encounter   Procedures    Ambulatory referral to Physical Therapy     Referral Priority:   Routine     Referral Type:   Eval and Treat     Referral Reason:   Specialty Services Required     Requested Specialty:   Physical Therapist     Number of Visits Requested:   1     No orders of the defined types were placed in this encounter. Return in about 2 months (around 7/17/2022).       Mateusz Mondragon MD

## 2022-09-07 PROBLEM — F41.1 GENERALIZED ANXIETY DISORDER: Status: ACTIVE | Noted: 2022-09-07

## 2022-09-07 PROBLEM — F32.9 MAJOR DEPRESSIVE DISORDER: Status: ACTIVE | Noted: 2022-09-07

## 2022-09-07 PROBLEM — F39 MOOD DISORDER (HCC): Status: ACTIVE | Noted: 2022-09-07

## 2022-09-07 PROBLEM — M51.36 DEGENERATION OF INTERVERTEBRAL DISC OF LUMBAR REGION: Status: ACTIVE | Noted: 2022-09-07

## 2022-09-07 PROBLEM — S82.141A FRACTURE, TIBIAL PLATEAU, RIGHT, CLOSED, INITIAL ENCOUNTER: Status: RESOLVED | Noted: 2022-04-08 | Resolved: 2022-09-07

## 2022-09-07 PROBLEM — M54.50 ACUTE LOW BACK PAIN: Status: RESOLVED | Noted: 2017-07-11 | Resolved: 2022-09-07

## 2022-09-07 PROBLEM — R79.89 ELEVATED LIVER FUNCTION TESTS: Status: ACTIVE | Noted: 2022-09-07

## 2022-09-07 PROBLEM — M1A.00X0 CHRONIC GOUTY ARTHRITIS: Status: ACTIVE | Noted: 2022-09-07

## 2022-09-07 PROBLEM — K21.9 GASTROESOPHAGEAL REFLUX DISEASE: Status: ACTIVE | Noted: 2022-09-07

## 2022-09-12 ENCOUNTER — OFFICE VISIT (OUTPATIENT)
Dept: INTERNAL MEDICINE | Age: 52
End: 2022-09-12
Payer: COMMERCIAL

## 2022-09-12 VITALS
OXYGEN SATURATION: 99 % | BODY MASS INDEX: 27.97 KG/M2 | RESPIRATION RATE: 16 BRPM | HEART RATE: 75 BPM | WEIGHT: 195.4 LBS | DIASTOLIC BLOOD PRESSURE: 78 MMHG | SYSTOLIC BLOOD PRESSURE: 116 MMHG | HEIGHT: 70 IN

## 2022-09-12 DIAGNOSIS — Z12.11 SCREEN FOR COLON CANCER: ICD-10-CM

## 2022-09-12 DIAGNOSIS — F39 MOOD DISORDER (HCC): ICD-10-CM

## 2022-09-12 DIAGNOSIS — Z91.030 BEE STING ALLERGY: ICD-10-CM

## 2022-09-12 DIAGNOSIS — M51.36 DEGENERATION OF INTERVERTEBRAL DISC OF LUMBAR REGION: Primary | ICD-10-CM

## 2022-09-12 DIAGNOSIS — I10 ESSENTIAL (PRIMARY) HYPERTENSION: ICD-10-CM

## 2022-09-12 DIAGNOSIS — Z13.1 SCREENING FOR DIABETES MELLITUS: ICD-10-CM

## 2022-09-12 DIAGNOSIS — K21.9 GASTROESOPHAGEAL REFLUX DISEASE WITHOUT ESOPHAGITIS: ICD-10-CM

## 2022-09-12 DIAGNOSIS — M25.561 CHRONIC PAIN OF RIGHT KNEE: ICD-10-CM

## 2022-09-12 DIAGNOSIS — G89.29 CHRONIC PAIN OF RIGHT KNEE: ICD-10-CM

## 2022-09-12 DIAGNOSIS — Z82.49 FAMILY HISTORY OF EARLY CAD: ICD-10-CM

## 2022-09-12 DIAGNOSIS — K52.9 CHRONIC DIARRHEA: ICD-10-CM

## 2022-09-12 DIAGNOSIS — G89.4 CHRONIC PAIN SYNDROME: ICD-10-CM

## 2022-09-12 DIAGNOSIS — F51.04 PSYCHOPHYSIOLOGICAL INSOMNIA: ICD-10-CM

## 2022-09-12 LAB — HBA1C MFR BLD: 5.4 %

## 2022-09-12 PROCEDURE — 83036 HEMOGLOBIN GLYCOSYLATED A1C: CPT | Performed by: NURSE PRACTITIONER

## 2022-09-12 PROCEDURE — 99215 OFFICE O/P EST HI 40 MIN: CPT | Performed by: NURSE PRACTITIONER

## 2022-09-12 RX ORDER — DULOXETIN HYDROCHLORIDE 60 MG/1
60 CAPSULE, DELAYED RELEASE ORAL
COMMUNITY
Start: 2021-08-16

## 2022-09-12 RX ORDER — MELOXICAM 7.5 MG/1
7.5 TABLET ORAL
COMMUNITY
Start: 2022-07-21 | End: 2022-10-31

## 2022-09-12 RX ORDER — OLANZAPINE 5 MG/1
TABLET ORAL
COMMUNITY
Start: 2022-09-03

## 2022-09-12 RX ORDER — ZOLPIDEM TARTRATE 12.5 MG/1
12.5 TABLET, FILM COATED, EXTENDED RELEASE ORAL
COMMUNITY
Start: 2022-03-21 | End: 2022-09-12

## 2022-09-12 RX ORDER — NALOXONE HYDROCHLORIDE 4 MG/.1ML
4 SPRAY NASAL
COMMUNITY
Start: 2021-08-16

## 2022-09-12 RX ORDER — TRAMADOL HYDROCHLORIDE 50 MG/1
50 TABLET ORAL
COMMUNITY
Start: 2022-04-14 | End: 2022-09-12

## 2022-09-12 RX ORDER — PANTOPRAZOLE SODIUM 40 MG/1
40 TABLET, DELAYED RELEASE ORAL
COMMUNITY
Start: 2022-08-22 | End: 2022-10-31 | Stop reason: ALTCHOICE

## 2022-09-12 RX ORDER — ESZOPICLONE 3 MG/1
3 TABLET, FILM COATED ORAL NIGHTLY PRN
Qty: 30 TABLET | Refills: 2 | Status: SHIPPED | OUTPATIENT
Start: 2022-09-12 | End: 2022-10-12

## 2022-09-12 SDOH — ECONOMIC STABILITY: FOOD INSECURITY: WITHIN THE PAST 12 MONTHS, THE FOOD YOU BOUGHT JUST DIDN'T LAST AND YOU DIDN'T HAVE MONEY TO GET MORE.: NEVER TRUE

## 2022-09-12 SDOH — ECONOMIC STABILITY: FOOD INSECURITY: WITHIN THE PAST 12 MONTHS, YOU WORRIED THAT YOUR FOOD WOULD RUN OUT BEFORE YOU GOT MONEY TO BUY MORE.: NEVER TRUE

## 2022-09-12 ASSESSMENT — PATIENT HEALTH QUESTIONNAIRE - PHQ9
2. FEELING DOWN, DEPRESSED OR HOPELESS: 0
4. FEELING TIRED OR HAVING LITTLE ENERGY: 0
SUM OF ALL RESPONSES TO PHQ QUESTIONS 1-9: 3
6. FEELING BAD ABOUT YOURSELF - OR THAT YOU ARE A FAILURE OR HAVE LET YOURSELF OR YOUR FAMILY DOWN: 0
5. POOR APPETITE OR OVEREATING: 0
9. THOUGHTS THAT YOU WOULD BE BETTER OFF DEAD, OR OF HURTING YOURSELF: 0
1. LITTLE INTEREST OR PLEASURE IN DOING THINGS: 0
10. IF YOU CHECKED OFF ANY PROBLEMS, HOW DIFFICULT HAVE THESE PROBLEMS MADE IT FOR YOU TO DO YOUR WORK, TAKE CARE OF THINGS AT HOME, OR GET ALONG WITH OTHER PEOPLE: 0
SUM OF ALL RESPONSES TO PHQ QUESTIONS 1-9: 3
SUM OF ALL RESPONSES TO PHQ9 QUESTIONS 1 & 2: 0
8. MOVING OR SPEAKING SO SLOWLY THAT OTHER PEOPLE COULD HAVE NOTICED. OR THE OPPOSITE, BEING SO FIGETY OR RESTLESS THAT YOU HAVE BEEN MOVING AROUND A LOT MORE THAN USUAL: 0
SUM OF ALL RESPONSES TO PHQ QUESTIONS 1-9: 3
3. TROUBLE FALLING OR STAYING ASLEEP: 3
SUM OF ALL RESPONSES TO PHQ QUESTIONS 1-9: 3
7. TROUBLE CONCENTRATING ON THINGS, SUCH AS READING THE NEWSPAPER OR WATCHING TELEVISION: 0

## 2022-09-12 ASSESSMENT — ENCOUNTER SYMPTOMS
CHEST TIGHTNESS: 0
WHEEZING: 0
BLOOD IN STOOL: 0
COLOR CHANGE: 0
DIARRHEA: 1
SHORTNESS OF BREATH: 1
BACK PAIN: 1

## 2022-09-12 ASSESSMENT — SOCIAL DETERMINANTS OF HEALTH (SDOH): HOW HARD IS IT FOR YOU TO PAY FOR THE VERY BASICS LIKE FOOD, HOUSING, MEDICAL CARE, AND HEATING?: NOT HARD AT ALL

## 2022-09-12 NOTE — PROGRESS NOTES
[id number] [facility] [address] PRIMARY CARE  [address] [facility] [address]  Dept: [phone]  Dept Fax: [phone]: Kerri Billingsley (: 1970) is a 54 y.o. male, Established patient, here for evaluation of the following chief complaint(s):  New Patient (Was seen in [place] as a patient. Here to establish.), Lower Back Pain (Low back pain with sciatica bilaterally X15 years.), Knee Pain (Broke his right knee in 2022. Still having pain. Was supposed to do PT but the sciatic pain made PT too difficult. ), Diabetes (Patient has diabetes on his problem list but is unsure if he has it.), and Numbness (Numbness and tingling in bilateral fingers X7 days.)      PCP:  EVON Wright - CNP      Pt here today to establish care at [facility], formerly saw patient at [facility]. He has had back pain and sciatica for 15 yrs. Worse in last several months d/t falling from ladder and breaking his right tibial plateau, was seeing workman's comp. He was referred to phys therapy to help strengthen the leg but he states couldn't do it d/t his pain being so bad in his back. So, is still limping around badly on the leg. His pain is so bad in his back that he feels can't drive to get to the new job at [employer]. Is upset with his prev pcp because states she just doesn't get it or just doesn't care. Feels needs pain medicine to get to the job. He went to pain mx at [facility] and had a lot of shots done, but doesn't feel it helped at all. Says the pain mx dr at [facility] wanted to burn his nerves from his back, but pt did not want to do that \"and that was end of that\". So, that is why here. Frustrated. Years ago was on oxycontin for his pain and feels needs something like this again.  Dr. Gaudencio Caldera, pcp at [address] [place] [address] had given him tramadol but he states \"I could take 10 of those and not feel any better, they do nothing\". Is on mobic for his pain, takes it twice a day at times (written for once a day). Has had a bunch of labs done in recent months per his report at [address] He also is seeing Dr. Marii Marcos for mental health. Was referred by this pcp last summer d/t his request for klonopin TID. The psych dr did give him this dose. He feels needs to switch up his sleep medication again. Ambien 12.5mg doesn't seem to be as effective, wants to go back on lunesta. He has frequently went back and forth between these meds (restoril in past but this pcp advised him he can't be on 2 different benzos so would not fill this for him). He also was diagnosed with \"the lowest case of copd you could have\" per his report. Got covid last , was feeling chest heaviness for couple of months and constantly bringing up bloody mucus. He was at ER multiple times and scans didn't show anything. He did see Mary Polanco [facility] at [facility] and had a pft done showing the mild copd. He was put on inhaler and quit forcing self to push the mucus up- pulmo told him his anxiety was making worse and pt admits he was never coughing but felt like needed to get something up so would forcefully try and bring mucus up. Felt that was cause of blood. Says since being put on inhaler, people have mentioned his voice sounds different. He has never had a colonoscopy or done a cologuard. States has chronic diarrhea, has had since being a child. More that his stools are always loose. Is normal for him. Also, has concerns over his heart. No chest pain, but since covid more sob since last . His dad is oldest living male in family and he had a 10 way cabg at 56, grandfather  in 45s from coronary artery disease and paternal uncle  in early 46s from coronary artery disease. Pt has never had a stress test but really feels should at this time. Review of Systems   Constitutional:  Positive for fatigue.  Negative for unexpected weight change. Respiratory:  Positive for shortness of breath. Negative for chest tightness and wheezing. Cardiovascular: Negative. Negative for chest pain, palpitations and leg swelling. Gastrointestinal:  Positive for abdominal pain and diarrhea. Negative for blood in stool. Musculoskeletal:  Positive for arthralgias, back pain, gait problem and myalgias. Negative for joint swelling. Skin:  Negative for color change. Neurological:  Positive for weakness. Negative for dizziness, facial asymmetry, speech difficulty and light-headedness. Psychiatric/Behavioral:  Positive for sleep disturbance. Negative for dysphoric mood and suicidal ideas. The patient is nervous/anxious.       Past Medical History:   Diagnosis Date    Acute low back pain 7/11/2017    Anxiety     Chronic back pain     Degeneration of intervertebral disc of lumbar region     Fracture, tibial plateau, right, closed, initial encounter 4/8/2022    Gastroesophageal reflux disease     Hypertension     Insomnia     Major depressive disorder     Restless leg 6/12/2017     Past Surgical History:   Procedure Laterality Date    APPENDECTOMY      CHOLECYSTECTOMY      TONSILLECTOMY       Social History     Socioeconomic History    Marital status:      Spouse name: Not on file    Number of children: Not on file    Years of education: Not on file    Highest education level: Not on file   Occupational History    Not on file   Tobacco Use    Smoking status: Former    Smokeless tobacco: Never   Substance and Sexual Activity    Alcohol use: Yes     Comment: occasional    Drug use: No    Sexual activity: Not on file   Other Topics Concern    Not on file   Social History Narrative    Not on file     Social Determinants of Health     Financial Resource Strain: Low Risk     Difficulty of Paying Living Expenses: Not hard at all   Food Insecurity: No Food Insecurity    Worried About Running Out of Food in the Last Year: Never true    [address] in the Last Year: Never true   Transportation Needs: Not on file   Physical Activity: Not on file   Stress: Not on file   Social Connections: Not on file   Intimate Partner Violence: Not on file   Housing Stability: Not on file     Family History   Problem Relation Age of Onset    Heart Disease Father     Other Father         heart bypass     Cancer Maternal Grandmother         stomach    Heart Attack Paternal Grandfather       Allergies   Allergen Reactions    Acetaminophen Shortness Of Breath    Bee Venom Anaphylaxis    Hydrocortisone Anaphylaxis     Other reaction(s): Unknown     Prior to Admission medications    Medication Sig Start Date End Date Taking? Authorizing Provider   DULoxetine (CYMBALTA) 60 MG extended release capsule Take 60 mg by mouth 8/16/21  Yes Historical Provider, MD   meloxicam (MOBIC) 7.5 MG tablet Take 7.5 mg by mouth 7/21/22  Yes Historical Provider, MD   naloxone 4 MG/0.1ML LIQD nasal spray 4 mg by Nasal route 8/16/21  Yes Historical Provider, MD   OLANZapine (ZYPREXA) 5 MG tablet TAKE 1 TABLET BY MOUTH AT BEDTIME 9/3/22  Yes Historical Provider, MD   pantoprazole (PROTONIX) 40 MG tablet Take 40 mg by mouth 8/22/22  Yes Historical Provider, MD   eszopiclone 3 MG TABS Take 1 tablet by mouth nightly as needed (insomnia) for up to 30 days. 9/12/22 10/12/22 Yes EVON Miguel - CNP   losartan (COZAAR) 100 MG tablet Take 1 tablet by mouth daily 12/11/17  Yes Jarek Quintanilla APRN - CNP   clonazePAM (KLONOPIN) 1 MG tablet Take 1 tablet by mouth 2 times daily as needed for Anxiety .  11/10/17  Yes Jarek Dwight APRN - CNP   albuterol sulfate (PROAIR RESPICLICK) 456 (90 Base) MCG/ACT aerosol powder inhalation Inhale 1 puff into the lungs every 4 hours as needed for Wheezing or Shortness of Breath 8/7/17  Yes Jarek Dwight, APRN - CNP   EPINEPHrine (EPIPEN) 0.3 MG/0.3ML SOAJ injection Inject 0.3 mLs into the muscle as needed (allergic reaction) Use as directed for allergic reaction 8/7/17 Yes EVON Smith CNP   propranolol (INDERAL LA) 80 MG extended release capsule Take 1 capsule by mouth daily 8/7/17  Yes EVON Smith CNP                I have reviewed the patient's medical history in detail and updated the computerized patient record. OBJECTIVE    Vitals:    09/12/22 1455   BP: 116/78   Site: Left Upper Arm   Position: Sitting   Cuff Size: Medium Adult   Pulse: 75   Resp: 16   SpO2: 99%   Weight: 195 lb 6.4 oz (88.6 kg)   Height: 5' 9.69\" (1.77 m)       Physical Exam  Vitals and nursing note reviewed. Constitutional:       General: He is not in acute distress. Appearance: He is well-developed. He is not ill-appearing. Comments: Chronically ill appearing male. I haven't seen him since last summer, but barely recognized him today. Skin has gray undertones and waxy appearance, eyes mostly unfocused and speech sounds impaired, like his mouth is really dry or thick tongued which is different from in past. Sip of pop does help some but still hear a little speech impairment. Pt states \"I wasn't drinking on way over here\". I advised him was not accusing that but was concerned with this change and wanted to know when happened. He states, \"It was a joke\". That others have noticed his voice has been different, but I was first healthcare person to mention to him   HENT:      Head: Normocephalic and atraumatic. Mouth/Throat:      Mouth: Mucous membranes are dry. Pharynx: Oropharynx is clear. No oropharyngeal exudate or posterior oropharyngeal erythema. Eyes:      General: No scleral icterus. Extraocular Movements: Extraocular movements intact. Pupils: Pupils are equal, round, and reactive to light. Neck:      Thyroid: No thyromegaly. Cardiovascular:      Rate and Rhythm: Normal rate and regular rhythm. Heart sounds: Normal heart sounds. No murmur heard. Pulmonary:      Effort: Pulmonary effort is normal. No respiratory distress. Breath sounds: Normal breath sounds. No wheezing. Abdominal:      General: Bowel sounds are normal. There is no distension. Palpations: Abdomen is soft. Tenderness: There is no abdominal tenderness. Musculoskeletal:      Cervical back: Normal range of motion. Lymphadenopathy:      Cervical: No cervical adenopathy. Skin:     General: Skin is warm and dry. Coloration: Skin is not jaundiced. Findings: No rash. Comments: German Flurry undertones and waxy face     Neurological:      Mental Status: He is alert and oriented to person, place, and time. Cranial Nerves: Dysarthria present. No facial asymmetry. Motor: Motor function is intact. No tremor or abnormal muscle tone. Gait: Gait abnormal (d/t limping from right tibial fracture). Psychiatric:         Mood and Affect: Mood is anxious. Affect is flat. Speech: Speech is slurred (thickened speech, not fully slurring but speech sounds so different from last time spoke with him over a year ago) and tangential.         Behavior: Behavior is agitated. Judgment: Judgment is impulsive. ASSESSMENT/ PLAN    1. Degeneration of intervertebral disc of lumbar region  Chronic, worse since injuring right leg d/t impaired gait  -was seeing pain mx at [facility] and had several rounds of injections per pt without relief. Pt did not want ablation to nerves in back that was suggested as next step  -was given tramadol by Dr. Nalini Martin, [address] [place] [address] but he says did not work. Advised that would not give any narcotics for pain relief since is chronic issue. Again reminded him as have in past, that I have concerns about how much narcotic meds he has taken/takes. He does have a narcan px now- on klonopin and switching to lunesta, but has been on opioids in past as well  -advised can refer to pain mx at [facility] for 2nd opinion since he is very unhappy and feels his QOL is poor  - [facility] - Sydni Hale MD, Pain Management, [place]    2.  Chronic pain syndrome  -chronic, uncontrolled. See #1    3. Psychophysiological insomnia  -chronic, feels ambien not working well  -will switch back to [place], feel like make this change once a year but do not have access to records at [address] from when last cared for him yet to confirm    4. Mood disorder (HCC)  Chronic, seems slightly uncontrolled. I suspect his speech irregularities today could be possibly attributed to taking too much medication? Am requesting testing from prev dr. Alana Albright appear to be having a stroke based on exam today, but articulation problems noted  -sees Dr. Tania Pineda at [facility] in [place] for mental health  -on zyprexa, klonopin and cymbalta    5. Essential (primary) hypertension  Chronic, stable on losartan. Continue unchanged    6. Chronic diarrhea  Chronic, refer to gastro for colonoscopy  - [address] MD, Gastroenterology, [place]    7. GERD without esophagitis  -chronic, stable on ppi  -unsure of last egd, will refer to gastro for chronic diarrhea/colonoscopy need and possibly the need for egd on gi referral    8. Family history of early CAD  -pgf and paternal uncle  in 45s and 46s from coronary artery disease, dad had 10 way CABG early 46s. Pt has never had stress test. Feel is high risk  - NM MYOCARDIAL SPECT REST EXERCISE OR RX; Future    9. Screen for diabetes mellitus  -poct A1c is 5.4%, advised is not diabetic. Diabetes was listed in his external chart as a problem when reconciled. Believe this was in error, he has never carried this diagnosis per his memory or mine from when cared for him at [address] in past    8.  Screen for colon cancer  Has never had colonoscopy, does have chronic diarrhea  - [address] Rosado MD, Gastroenterology, [place]          On this date 2022 I have spent 45 minutes reviewing previous notes, test results and face to face with the patient discussing the diagnosis and importance of compliance with the treatment plan as well as documenting on the day of the visit. Return in about 6 months (around 3/12/2023).      Electronically signed by:  EVON Montilla CNP   9/13/22

## 2022-09-13 PROBLEM — G89.29 CHRONIC PAIN OF RIGHT KNEE: Status: ACTIVE | Noted: 2022-09-13

## 2022-09-13 PROBLEM — F32.9 MAJOR DEPRESSIVE DISORDER: Status: RESOLVED | Noted: 2022-09-07 | Resolved: 2022-09-13

## 2022-09-13 PROBLEM — Z91.030 BEE STING ALLERGY: Status: ACTIVE | Noted: 2022-09-13

## 2022-09-13 PROBLEM — M25.561 CHRONIC PAIN OF RIGHT KNEE: Status: ACTIVE | Noted: 2022-09-13

## 2022-09-13 ASSESSMENT — ENCOUNTER SYMPTOMS: ABDOMINAL PAIN: 1

## 2022-09-14 ENCOUNTER — TELEPHONE (OUTPATIENT)
Dept: INTERNAL MEDICINE | Age: 52
End: 2022-09-14

## 2022-09-14 NOTE — TELEPHONE ENCOUNTER
If is going through with good rx card, that is best is going to do. Pt will likely let me know if doesn't get this med.

## 2022-09-14 NOTE — TELEPHONE ENCOUNTER
I called [facility] to see if patient was able to  his Lunesta. I received a denial from patient's insurance. Per the pharmacist, it is going thru with a good rx card of only 13.00 dollars. Do you need any further action?

## 2022-09-18 ENCOUNTER — TELEPHONE (OUTPATIENT)
Dept: FAMILY MEDICINE CLINIC | Age: 52
End: 2022-09-18

## 2022-09-18 NOTE — TELEPHONE ENCOUNTER
On-call page: Patient is calling complaining of severe diarrhea occurring from yesterday evening until this morning. Denies any abdominal pain with exception of cramping prior to having a bowel movement. He denies any fever, chills or blood in stool. There is no one else sick in the household. He has never had these symptoms before. He denies any new foods or medications.   I advised that he hydrate and if symptoms continue to come into the walk-in clinic

## 2022-10-14 NOTE — TELEPHONE ENCOUNTER
Patient is requesting medication refill.  Please approve or deny this request.    Rx requested:  Requested Prescriptions     Pending Prescriptions Disp Refills    albuterol sulfate (PROAIR RESPICLICK) 435 (90 Base) MCG/ACT aerosol powder inhalation 1 each 3     Sig: Inhale 1 puff into the lungs every 4 hours as needed for Wheezing or Shortness of Breath         Last Office Visit:   9/12/2022      Next Visit Date:  Future Appointments   Date Time Provider Shanda Cameron   10/17/2022 10:00 AM Jerry Maria[address]   3/13/2023  3:30 PM [address] [facility]

## 2022-10-17 ENCOUNTER — TELEMEDICINE (OUTPATIENT)
Dept: INTERNAL MEDICINE | Age: 52
End: 2022-10-17
Payer: COMMERCIAL

## 2022-10-17 DIAGNOSIS — F51.04 PSYCHOPHYSIOLOGICAL INSOMNIA: ICD-10-CM

## 2022-10-17 DIAGNOSIS — N40.0 ENLARGED PROSTATE: ICD-10-CM

## 2022-10-17 DIAGNOSIS — N10 PYELONEPHRITIS, ACUTE: Primary | ICD-10-CM

## 2022-10-17 DIAGNOSIS — R33.9 URINARY RETENTION: ICD-10-CM

## 2022-10-17 PROCEDURE — 99213 OFFICE O/P EST LOW 20 MIN: CPT | Performed by: NURSE PRACTITIONER

## 2022-10-17 RX ORDER — FLUOXETINE HYDROCHLORIDE 20 MG/1
CAPSULE ORAL
COMMUNITY
Start: 2022-10-14

## 2022-10-17 RX ORDER — CREAM BASE NO.47
CREAM (GRAM) TOPICAL
COMMUNITY
Start: 2022-09-12

## 2022-10-17 RX ORDER — OMEPRAZOLE 40 MG/1
CAPSULE, DELAYED RELEASE ORAL
COMMUNITY
Start: 2022-09-27 | End: 2022-10-31 | Stop reason: SDUPTHER

## 2022-10-17 RX ORDER — MIRTAZAPINE 7.5 MG/1
TABLET, FILM COATED ORAL
COMMUNITY
Start: 2022-10-03

## 2022-10-17 RX ORDER — TAMSULOSIN HYDROCHLORIDE 0.4 MG/1
CAPSULE ORAL
COMMUNITY
Start: 2022-09-28 | End: 2022-10-17 | Stop reason: SDUPTHER

## 2022-10-17 RX ORDER — ZOLPIDEM TARTRATE 12.5 MG/1
12.5 TABLET, FILM COATED, EXTENDED RELEASE ORAL NIGHTLY PRN
Qty: 30 TABLET | Refills: 5 | Status: SHIPPED | OUTPATIENT
Start: 2022-10-17 | End: 2022-11-16

## 2022-10-17 RX ORDER — TAMSULOSIN HYDROCHLORIDE 0.4 MG/1
CAPSULE ORAL
Qty: 30 CAPSULE | Refills: 1 | Status: SHIPPED | OUTPATIENT
Start: 2022-10-17

## 2022-10-17 RX ORDER — ALBUTEROL SULFATE 90 UG/1
1 POWDER, METERED RESPIRATORY (INHALATION) EVERY 4 HOURS PRN
Qty: 1 EACH | Refills: 3 | Status: SHIPPED | OUTPATIENT
Start: 2022-10-17

## 2022-10-17 ASSESSMENT — ENCOUNTER SYMPTOMS
BLOOD IN STOOL: 0
ABDOMINAL PAIN: 0
COLOR CHANGE: 0
RESPIRATORY NEGATIVE: 1
SHORTNESS OF BREATH: 0

## 2022-10-17 NOTE — PROGRESS NOTES
SUBJECTIVE    Jose Guadalupe Ness (: 1970) is a 50 y.o. male, Established patient, here for evaluation of the following chief complaint(s):  Follow-Up from Hospital (Was in hospital from - for kidney infection. Patient is feeling better now. Started on Flomax. Wants to know if he needs to continue with this.)      PCP:  Jackelyn Martinez, EVON - CNP      Pt here today for hospital f/u. He reports was admitted 10/6-10/9 at [facility] for kidney infection. Infection got so bad that he was hallucinating, so was admitted to psych calderon there. After in there for a day and half, they did a urine test. Can see ER record on  at [facility] where was seen for urinary retention and back pain, started on flomax x10 days, but can't see hospital record. Has never had issues with urinary infections before. He can't remember having trouble peeing or pain with peeing. He feels that his infection is doing much better. Was put on flomax. Wants to know if should continue this. He was also told his prostate is enlarged. His dad had prostate cancer, so this concerns him.        Social History     Socioeconomic History    Marital status:      Spouse name: Not on file    Number of children: Not on file    Years of education: Not on file    Highest education level: Not on file   Occupational History    Not on file   Tobacco Use    Smoking status: Former     Packs/day: 1.00     Years: 13.00     Pack years: 13.00     Types: Cigarettes     Start date:      Quit date: 2013     Years since quittin.7    Smokeless tobacco: Never   Substance and Sexual Activity    Alcohol use: Not Currently     Comment: occasional    Drug use: No    Sexual activity: Not on file   Other Topics Concern    Not on file   Social History Narrative    Not on file     Social Determinants of Health     Financial Resource Strain: Low Risk     Difficulty of Paying Living Expenses: Not hard at all   Food Insecurity: No Food Insecurity    Worried About Running Out of Food in the Last Year: Never true    Ran Out of Food in the Last Year: Never true   Transportation Needs: Not on file   Physical Activity: Not on file   Stress: Not on file   Social Connections: Not on file   Intimate Partner Violence: Not on file   Housing Stability: Not on file       Review of Systems   Constitutional: Negative. Negative for fatigue and fever. Respiratory: Negative. Negative for shortness of breath. Cardiovascular: Negative. Gastrointestinal:  Negative for abdominal pain and blood in stool. Genitourinary:  Negative for decreased urine volume, difficulty urinating, dysuria, flank pain and hematuria. Skin:  Negative for color change and pallor. Neurological:  Negative for weakness. Psychiatric/Behavioral: Negative. Negative for dysphoric mood and hallucinations. I have reviewed the patient's medical history in detail and updated the computerized patient record. OBJECTIVE    There were no vitals filed for this visit. Physical Exam  Vitals and nursing note reviewed. Constitutional:       General: He is not in acute distress. Appearance: Normal appearance. He is not ill-appearing, toxic-appearing or diaphoretic. HENT:      Head: Normocephalic and atraumatic. Nose: Nose normal.      Mouth/Throat:      Mouth: Mucous membranes are moist.   Eyes:      Conjunctiva/sclera: Conjunctivae normal.   Pulmonary:      Effort: Pulmonary effort is normal. No respiratory distress. Musculoskeletal:         General: Normal range of motion. Cervical back: Normal range of motion and neck supple. Skin:     General: Skin is warm and dry. Neurological:      General: No focal deficit present. Mental Status: He is alert and oriented to person, place, and time. Psychiatric:         Mood and Affect: Mood normal.         Thought Content: Thought content normal.             ASSESSMENT/ PLAN    1.  Pyelonephritis, acute  Acute, resolved with hospitalization. Records not available and pt is virtual so can't sign release for [facility] in  [place]  -advised to continue flomax, will refer to uro d/t he reports being told his prostate was enlarged. He had no symptoms of uti until he was hallucinating.  - tamsulosin (FLOMAX) 0.4 MG capsule; TAKE 1 CAPSULE BY MOUTH DAILY  Dispense: 30 capsule; Refill: 1    2. Enlarged prostate  See above  - External Referral to Urology    3. Urinary retention  See above  - tamsulosin (FLOMAX) 0.4 MG capsule; TAKE 1 CAPSULE BY MOUTH DAILY  Dispense: 30 capsule; Refill: 1  - External Referral to Urology    4. Psychophysiological insomnia  Chronic, feels Richard Friends is not working. He alternates between these two meds- take lunesta awhile then will go to [place] and back. - [place]AR[place] reviewed through external source without concerns. - zolpidem (AMBIEN CR) 12.5 MG extended release tablet; Take 1 tablet by mouth nightly as needed for Sleep for up to 30 days. Dispense: 30 tablet; Refill: 5  -stop lunesta        Return in about 6 months (around 4/17/2023) for chronic recheck. Selena Phelan was evaluated through a synchronous (real-time) audio-video encounter. The patient (or guardian if applicable) is aware that this is a billable service. Verbal consent to proceed has been obtained within the past 12 months. The visit was conducted pursuant to the emergency declaration under the [address] [place] [address], [address] [place] [address] authority and the [employer] General Act. Patient identification was verified, and a caregiver was present when appropriate. The patient was located in a state where the provider was credentialed to provide care.     Electronically signed by:  Margherita Goltz, APRN - CNP   10/17/22

## 2022-10-31 ENCOUNTER — NURSE TRIAGE (OUTPATIENT)
Dept: OTHER | Facility: CLINIC | Age: 52
End: 2022-10-31

## 2022-10-31 ENCOUNTER — TELEPHONE (OUTPATIENT)
Dept: INTERNAL MEDICINE | Age: 52
End: 2022-10-31

## 2022-10-31 ENCOUNTER — OFFICE VISIT (OUTPATIENT)
Dept: INTERNAL MEDICINE | Age: 52
End: 2022-10-31
Payer: COMMERCIAL

## 2022-10-31 VITALS
DIASTOLIC BLOOD PRESSURE: 70 MMHG | TEMPERATURE: 97.9 F | BODY MASS INDEX: 25.2 KG/M2 | HEART RATE: 111 BPM | OXYGEN SATURATION: 98 % | SYSTOLIC BLOOD PRESSURE: 94 MMHG | RESPIRATION RATE: 16 BRPM | HEIGHT: 70 IN | WEIGHT: 176 LBS

## 2022-10-31 DIAGNOSIS — R63.4 WEIGHT LOSS: ICD-10-CM

## 2022-10-31 DIAGNOSIS — Z12.11 SCREEN FOR COLON CANCER: ICD-10-CM

## 2022-10-31 DIAGNOSIS — R68.89: ICD-10-CM

## 2022-10-31 DIAGNOSIS — Z87.440 PERSONAL HISTORY UTI: ICD-10-CM

## 2022-10-31 DIAGNOSIS — R53.1 WEAKNESS: Primary | ICD-10-CM

## 2022-10-31 DIAGNOSIS — K21.9 GASTROESOPHAGEAL REFLUX DISEASE WITHOUT ESOPHAGITIS: ICD-10-CM

## 2022-10-31 DIAGNOSIS — I95.9 HYPOTENSION, UNSPECIFIED HYPOTENSION TYPE: ICD-10-CM

## 2022-10-31 DIAGNOSIS — R53.1 WEAKNESS: ICD-10-CM

## 2022-10-31 DIAGNOSIS — F80.1 SPEECH DELAY, EXPRESSIVE: ICD-10-CM

## 2022-10-31 DIAGNOSIS — Z02.89 MEDICATION MANAGEMENT CONTRACT AGREEMENT: ICD-10-CM

## 2022-10-31 DIAGNOSIS — N40.0 ENLARGED PROSTATE: ICD-10-CM

## 2022-10-31 DIAGNOSIS — Z11.59 NEED FOR HEPATITIS C SCREENING TEST: ICD-10-CM

## 2022-10-31 DIAGNOSIS — R25.1 TREMOR OF BOTH HANDS: ICD-10-CM

## 2022-10-31 DIAGNOSIS — R41.3 MEMORY CHANGE: ICD-10-CM

## 2022-10-31 DIAGNOSIS — R33.9 URINARY RETENTION: ICD-10-CM

## 2022-10-31 DIAGNOSIS — Z11.4 ENCOUNTER FOR SCREENING FOR HIV: ICD-10-CM

## 2022-10-31 LAB
ALBUMIN SERPL-MCNC: 3.8 G/DL (ref 3.5–4.6)
ALP BLD-CCNC: 90 U/L (ref 35–104)
ALT SERPL-CCNC: 18 U/L (ref 0–41)
ANION GAP SERPL CALCULATED.3IONS-SCNC: 16 MEQ/L (ref 9–15)
AST SERPL-CCNC: 19 U/L (ref 0–40)
BASOPHILS ABSOLUTE: 0 K/UL (ref 0–0.2)
BASOPHILS RELATIVE PERCENT: 0.5 %
BILIRUB SERPL-MCNC: 0.5 MG/DL (ref 0.2–0.7)
BILIRUBIN, POC: NORMAL
BLOOD URINE, POC: NORMAL
BUN BLDV-MCNC: 4 MG/DL (ref 6–20)
C-REACTIVE PROTEIN: 6 MG/L (ref 0–5)
CALCIUM SERPL-MCNC: 9.2 MG/DL (ref 8.5–9.9)
CHLORIDE BLD-SCNC: 101 MEQ/L (ref 95–107)
CLARITY, POC: CLEAR
CO2: 23 MEQ/L (ref 20–31)
COLOR, POC: YELLOW
CREAT SERPL-MCNC: 0.92 MG/DL (ref 0.7–1.2)
EOSINOPHILS ABSOLUTE: 0.1 K/UL (ref 0–0.7)
EOSINOPHILS RELATIVE PERCENT: 1.5 %
GFR SERPL CREATININE-BSD FRML MDRD: >60 ML/MIN/{1.73_M2}
GLOBULIN: 2.5 G/DL (ref 2.3–3.5)
GLUCOSE BLD-MCNC: 128 MG/DL (ref 70–99)
GLUCOSE URINE, POC: NORMAL
HCT VFR BLD CALC: 35.6 % (ref 42–52)
HEMOGLOBIN: 11.6 G/DL (ref 14–18)
KETONES, POC: NORMAL
LEUKOCYTE EST, POC: NORMAL
LYMPHOCYTES ABSOLUTE: 1 K/UL (ref 1–4.8)
LYMPHOCYTES RELATIVE PERCENT: 19.6 %
MAGNESIUM: 1.7 MG/DL (ref 1.7–2.4)
MCH RBC QN AUTO: 30.6 PG (ref 27–31.3)
MCHC RBC AUTO-ENTMCNC: 32.7 % (ref 33–37)
MCV RBC AUTO: 93.7 FL (ref 79–92.2)
MONOCYTES ABSOLUTE: 0.3 K/UL (ref 0.2–0.8)
MONOCYTES RELATIVE PERCENT: 6.5 %
NEUTROPHILS ABSOLUTE: 3.7 K/UL (ref 1.4–6.5)
NEUTROPHILS RELATIVE PERCENT: 71.9 %
NITRITE, POC: NORMAL
PDW BLD-RTO: 17.2 % (ref 11.5–14.5)
PH, POC: 6
PLATELET # BLD: 158 K/UL (ref 130–400)
POTASSIUM SERPL-SCNC: 3.3 MEQ/L (ref 3.4–4.9)
PROTEIN, POC: NORMAL
RBC # BLD: 3.8 M/UL (ref 4.7–6.1)
SEDIMENTATION RATE, ERYTHROCYTE: 16 MM (ref 0–20)
SODIUM BLD-SCNC: 140 MEQ/L (ref 135–144)
SPECIFIC GRAVITY, POC: 1.01
TOTAL PROTEIN: 6.3 G/DL (ref 6.3–8)
TSH REFLEX: 0.9 UIU/ML (ref 0.44–3.86)
UROBILINOGEN, POC: NORMAL
WBC # BLD: 5.1 K/UL (ref 4.8–10.8)

## 2022-10-31 PROCEDURE — 3078F DIAST BP <80 MM HG: CPT | Performed by: NURSE PRACTITIONER

## 2022-10-31 PROCEDURE — 81003 URINALYSIS AUTO W/O SCOPE: CPT | Performed by: NURSE PRACTITIONER

## 2022-10-31 PROCEDURE — 99215 OFFICE O/P EST HI 40 MIN: CPT | Performed by: NURSE PRACTITIONER

## 2022-10-31 PROCEDURE — 3074F SYST BP LT 130 MM HG: CPT | Performed by: NURSE PRACTITIONER

## 2022-10-31 RX ORDER — OMEPRAZOLE 40 MG/1
CAPSULE, DELAYED RELEASE ORAL
Qty: 30 CAPSULE | Refills: 5 | Status: SHIPPED | OUTPATIENT
Start: 2022-10-31

## 2022-10-31 ASSESSMENT — ENCOUNTER SYMPTOMS
BACK PAIN: 1
ABDOMINAL PAIN: 0
ABDOMINAL DISTENTION: 0
SHORTNESS OF BREATH: 0
BLOOD IN STOOL: 0
NAUSEA: 1
TROUBLE SWALLOWING: 0
COLOR CHANGE: 0
WHEEZING: 0
VOICE CHANGE: 0
DIARRHEA: 1
SORE THROAT: 0
EYE DISCHARGE: 0
VOMITING: 0
CONSTIPATION: 0
COUGH: 1
PHOTOPHOBIA: 0
RHINORRHEA: 0
CHEST TIGHTNESS: 0

## 2022-10-31 NOTE — PROGRESS NOTES
[address] PRIMARY CARE  [address] [address]  Dept: [phone]  Dept Fax: [phone]: Kerri Billingsley (: 1970) is a 50 y.o. male, Established patient, here for evaluation of the following chief complaint(s):  Dizziness (Lightheadedness, muscle weakness,unsteadiness X2 weeks. On Saturday his legs gave out and his wife had to come and get him. Symptoms just happen randomly and suddenly. ), Discuss Medications (Needs Omeprazole or Protonix  refilled. Unsure of which he is taking.), and Weight Loss (Has lost a lot of weight in a short amount of time.)      PCP:  EVON Acosta CNP      Pt called triage line today:      Subjective: Caller states \"having seizures and buzzing sensation in head for a couple of weeks\"      Current Symptoms: started every once in a while with what he thought was vertigo. It will be without warning, head will \"buzz\", like a drunk feeling but without being drunk. Limbs will get weak and heavy. Happened the other day after getting out of the shower. Laid down after that. If laying down for a while it will pass,  He is aware of what is going on the whole time. No history of seizures. Saturday happened a lot  Yesterday was fine. Last time episodes occurred was Saturday. No illness     Onset: couple of weeks   ago; intermittent     Associated Symptoms: NA     Pain Severity:  denies     Temperature: denies      What has been tried: will lay down     LMP: NA Pregnant: NA     Recommended disposition: See in Office Today or Tomorrow     Care advice provided, patient verbalizes understanding; denies any other questions or concerns; instructed to call back for any new or worsening symptoms. END TRIAGE NOTE        He has been having issues for 2 weeks. Falling down d/t legs giving out and weakness- fell on Saturday d/t this.  He was in the tub, when went to get out he went straight down. His wife had to help him up and into bed, was rough. Didn't get hurt. This was his worst episode. Has had other smaller episodes in bathroom or kitchen, will grab something and steady self allowing eyes to fixate on a fixed point to try and get his bearings. Helps with head spin. Muscle weakness happens as well. Usually passes this after 10-15 min. If lies down does help relieve his symptoms. Prior to this last couple of weeks, has never had issues with this before. He never heard from Lynne Campos for stress test ordered in Sept for some post-covid issues- no chest pain. Also has signif coronary artery disease in family, his father lived longest to age 54 with CABG. He has also lost a lot of weight unintentionally and quickly in recent months. Has cut back on portions d/t was unhappy with his weight last year. His weight was high at 235lb but was on a lot of steroids for chronic pain in his foot that was felt to be gout. Now when gets phlegmy which he reports is often since has a little issue with copd following covid last winter, he doesn't have much appetite. No heartburn symptoms. Does take omeprazole. Has chronic diarrhea, no changes there denies any blood or black in stools. Has not scheduled with Dr. Toro Higginbotham yet for this as also has never had a colonoscopy. Urology: Was admitted to [facility] last month for uti. He was admitted to psych unit at time d/t was hallucinating. Had no symptoms of uti, but eventually was tested and found to be issue. He was referred to [facility] uro in [address] during video visit on 10/17, but has not heard from them. Today at start of visit he brings this up and asks to be [facility]. I said ok.  About 5 minutes later, he says again that he has not heard about the urethra dr. I asked what he meant and he said again, why don't you go ahead and just move all my care to [facility] (after just stating this exact thing- he didn't remember this). Not having any urinary symptoms now. 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 Has had weakness somewhat in his hands since. Can sometimes hold gallon of milk ok, but other times has to use his other hand to support bottom of it. Denies any tremors in past.     Asked pt if he has had any issues with confusion, which he doesn't think so. I asked him to complete the MMSE for me today. He states well I have a degree in psychology. Advised am concerned about his memory. No one has said any concern about this to him and he really felt concerned either. General [facility] MINI-MENTAL STATUS EXAM (Umu Mares)    Aminah Emory  1970  50 y.o. Orientation:    What is the Year? Season? Date? Day? Month?   (indicate # correct, max 5)        4  said year 2002. At end of him answering the other 4 questions, I asked him the year again, and again he states 2002. At very end of exam today when going over this, I pointed out that he got this incorrect and said 2002. He says oh I know it's 2022, She Cha is president, and Joaquinemerita was before him. Where are we: State? County? Town? Place? Floor? (indicate # correct,max 5)        5    Registration:  Name 3 objects and have pt repeat. Apple, kaylah, table                (indicate # correct, max 3)        3    Attention and Calculation:  Serial 7's or spell \"world\" backwards.                   (indicate # correct, max 5)        1  *couldn't spell world (he states L-D-R- then couldn't go any further) so tried serial 7s.  He went 100-93, but then couldn't count backwards past 93, then kept saying 80 something quietly (not 84), then jumped to 73 and couldn't go any further)    Recall:  Recall 3 objects                (indicate # correct, max3)        2, forgot last one    Language:  Patient names a pencil & a watch                (indicate # legs just giving out   Hematological:  Negative for adenopathy. Does not bruise/bleed easily. Psychiatric/Behavioral: Negative. Negative for behavioral problems, confusion, decreased concentration, dysphoric mood, hallucinations and sleep disturbance (on ambien for this). The patient is not nervous/anxious.       Past Medical History:   Diagnosis Date    Acute low back pain 2017    Anxiety     Chronic back pain     Degeneration of intervertebral disc of lumbar region     Fracture, tibial plateau, right, closed, initial encounter 2022    Gastroesophageal reflux disease     Hypertension     Insomnia     Major depressive disorder     Restless leg 2017     Past Surgical History:   Procedure Laterality Date    APPENDECTOMY      CHOLECYSTECTOMY      TONSILLECTOMY       Social History     Socioeconomic History    Marital status:      Spouse name: Not on file    Number of children: Not on file    Years of education: Not on file    Highest education level: Not on file   Occupational History    Not on file   Tobacco Use    Smoking status: Former     Packs/day: 1.00     Years: 13.00     Pack years: 13.00     Types: Cigarettes     Start date:      Quit date:      Years since quittin.8    Smokeless tobacco: Never   Substance and Sexual Activity    Alcohol use: Not Currently     Comment: occasional    Drug use: No    Sexual activity: Not on file   Other Topics Concern    Not on file   Social History Narrative    Not on file     Social Determinants of Health     Financial Resource Strain: Low Risk     Difficulty of Paying Living Expenses: Not hard at all   Food Insecurity: No Food Insecurity    Worried About Running Out of Food in the Last Year: Never true    Ran Out of Food in the Last Year: Never true   Transportation Needs: Not on file   Physical Activity: Not on file   Stress: Not on file   Social Connections: Not on file   Intimate Partner Violence: Not on file   Housing Stability: Not on file     Family History   Problem Relation Age of Onset    Heart Disease Father     Other Father         heart bypass     Cancer Maternal Grandmother         stomach    Heart Attack Paternal Grandfather       Allergies   Allergen Reactions    Acetaminophen Shortness Of Breath    Bee Venom Anaphylaxis    Hydrocortisone Anaphylaxis     Other reaction(s): Unknown     Prior to Admission medications    Medication Sig Start Date End Date Taking? Authorizing Provider   omeprazole (PRILOSEC) 40 MG delayed release capsule TAKE 1 CAPSULE BY MOUTH DAILY 10/31/22  Yes EVON Nguyễn CNP   albuterol sulfate (PROAIR RESPICLICK) 818 (90 Base) MCG/ACT aerosol powder inhalation Inhale 1 puff into the lungs every 4 hours as needed for Wheezing or Shortness of Breath 10/17/22  Yes EVON Nguyễn CNP   mirtazapine (REMERON) 7.5 MG tablet TAKE 1 TABLET BY MOUTH DAILY AT BEDTIME 10/3/22  Yes Historical Provider, MD   FLUoxetine (PROZAC) 20 MG capsule TAKE 1 CAPSULE BY MOUTH EVERY DAY 10/14/22  Yes Historical Provider, MD   Cream Base ([address]) CREA  9/12/22  Yes Historical Provider, MD   tamsulosin (FLOMAX) 0.4 MG capsule TAKE 1 CAPSULE BY MOUTH DAILY 10/17/22  Yes EVON Nguyễn CNP   zolpidem (AMBIEN CR) 12.5 MG extended release tablet Take 1 tablet by mouth nightly as needed for Sleep for up to 30 days. 10/17/22 11/16/22 Yes EVON Nguyễn CNP   DULoxetine (CYMBALTA) 60 MG extended release capsule Take 60 mg by mouth 8/16/21  Yes Historical Provider, MD   naloxone 4 MG/0.1ML LIQD nasal spray 4 mg by Nasal route 8/16/21  Yes Historical Provider, MD   OLANZapine (ZYPREXA) 5 MG tablet TAKE 1 TABLET BY MOUTH AT BEDTIME 9/3/22  Yes Historical Provider, MD   losartan (COZAAR) 100 MG tablet Take 1 tablet by mouth daily 12/11/17  Yes EVON Diallo CNP   clonazePAM (KLONOPIN) 1 MG tablet Take 1 tablet by mouth 2 times daily as needed for Anxiety .  11/10/17  Yes EVON Diallo CNP EPINEPHrine (EPIPEN) 0.3 MG/0.3ML SOAJ injection Inject 0.3 mLs into the muscle as needed (allergic reaction) Use as directed for allergic reaction 8/7/17  Yes EVON Barton CNP   propranolol (INDERAL LA) 80 MG extended release capsule Take 1 capsule by mouth daily 8/7/17  Yes EVON Barton CNP                I have reviewed the patient's medical history in detail and updated the computerized patient record. OBJECTIVE    Vitals:    10/31/22 1141 10/31/22 1321   BP: 100/80 94/70   Site: Right Upper Arm    Position: Sitting    Cuff Size: Medium Adult    Pulse: (!) 111    Resp: 16    Temp: 97.9 °F (36.6 °C)    TempSrc: Infrared    SpO2: 98%    Weight: 176 lb (79.8 kg)    Height: 5' 9.69\" (1.77 m)        Physical Exam  Vitals and nursing note reviewed. Constitutional:       General: He is not in acute distress. Appearance: He is well-developed. He is ill-appearing (chronic ill appearance). HENT:      Head: Normocephalic and atraumatic. Right Ear: Tympanic membrane, ear canal and external ear normal. There is no impacted cerumen. Left Ear: Tympanic membrane, ear canal and external ear normal. There is no impacted cerumen. Nose: Nose normal.      Mouth/Throat:      Mouth: Mucous membranes are moist.      Pharynx: No oropharyngeal exudate or posterior oropharyngeal erythema. Eyes:      General: No scleral icterus. Right eye: No discharge. Left eye: No discharge. Extraocular Movements: Extraocular movements intact. Conjunctiva/sclera: Conjunctivae normal.      Pupils: Pupils are equal, round, and reactive to light. Neck:      Thyroid: No thyromegaly. Cardiovascular:      Rate and Rhythm: Normal rate and regular rhythm. Heart sounds: Normal heart sounds. No murmur heard. Pulmonary:      Effort: Pulmonary effort is normal. No respiratory distress. Breath sounds: Normal breath sounds. No wheezing.    Abdominal:      General: Bowel sounds are normal. There is no distension. Palpations: Abdomen is soft. Musculoskeletal:      Right hand: Decreased strength. Left hand: Decreased strength. Cervical back: Normal range of motion and neck supple. No tenderness. Right lower leg: No edema. Left lower leg: No edema. Lymphadenopathy:      Cervical: No cervical adenopathy. Skin:     General: Skin is warm and dry. Comments: Ashen and waxy in color. Same as 6 weeks ago when re-established care with me   Neurological:      Mental Status: He is alert. He is disoriented. Cranial Nerves: Dysarthria and facial asymmetry (mild right facial droop at rest, was present 6 weeks ago. hadn't seen pt in over a year. his facial structures look much different to me (but we also wore masks in all our previous meetings)) present. Motor: Weakness (hand  very weak, lower legs weak but has hx of back issues so this doesn't seem new) and tremor present. No seizure activity. Coordination: Coordination abnormal.      Gait: Gait abnormal (easily stands from seated position and walks down arguello with slight ataxia noted- not new). Comments: Voice sounds thick but not garbled today- noted when saw 6 weeks ago for first time he had some garbled speech. Delayed and slowed speech processes today but able to communicate his thoughts. Fine tremor noted to both hands at rest (new) most notable in index and middle finger on left hand when asked about tremor seemed to stop rest of it. Weak hand , L worse than R   Psychiatric:         Attention and Perception: Attention normal. He is attentive. He does not perceive auditory or visual hallucinations. Mood and Affect: Affect is blunt and flat. Speech: Speech is delayed. Behavior: Behavior is slowed. Behavior is cooperative. Thought Content: Thought content does not include suicidal ideation. Cognition and Memory: Cognition is impaired. Memory is impaired. Judgment: Judgment is inappropriate. ASSESSMENT/ PLAN    1. Weakness  Acute- concern for UTI vs TIA vs other neurological process going on  -check extensive labs and MRI with and without of brain  -hold losartan d/t hypotension. Watch propranolol, but elev hr and anxiety so fear stopping that as well, will revisit  -push fluids  -UA unremarkable today, sending for culture  -f/u in 1 week  - CBC with Auto Differential; Future  - Comprehensive Metabolic Panel; Future  - Magnesium; Future  - Culture, Urine; Future  - Pain Management Drug Screen; Future  - Ethanol, Urine; Future  - POCT Urinalysis No Micro (Auto)  - C-Reactive Protein; Future  - Sedimentation Rate; Future  - US CAROTID ARTERY BILATERAL; Future  - MRI BRAIN W WO CONTRAST; Future    2. Feeling of being drunk  new  - CBC with Auto Differential; Future  - Comprehensive Metabolic Panel; Future  - Magnesium; Future  - Culture, Urine; Future  - Pain Management Drug Screen; Future  - Ethanol, Urine; Future  - POCT Urinalysis No Micro (Auto)  - C-Reactive Protein; Future  - Sedimentation Rate; Future  - US CAROTID ARTERY BILATERAL; Future  - MRI BRAIN W WO CONTRAST; Future    3. Weight loss  New, down 20 lbs in 6 weeks. Originally says unintentionally, but then says cutting portions and some nausea d/t phlegmy, check labs. Never scheduled with GI for his chronic diarrhea and need for colonoscopy and EGD, will reach out to Mateo's office  - TSH with Reflex; Future  - C-Reactive Protein; Future  - Sedimentation Rate; Future    4. Memory change  New, MMSE score of 25/30.   - US CAROTID ARTERY BILATERAL; Future  - MRI BRAIN W WO CONTRAST; Future  -concern for neuro issues, f/u in 1 week once get some labs back and other tests. Advised likely next step is neuro referral, will do thru [facility]    5. Speech delay, expressive  - MRI BRAIN W WO CONTRAST; Future    6. Tremor of both hands  - MRI BRAIN W WO CONTRAST; Future    7. Hypotension, unspecified hypotension type  New, hold losartan. Continue propranolol for now d/t elev hr    8. Urinary retention  -[facility], was [facility] this month and feel needs cysto  - Kojo Grace MD, Urology, [place]    9. Gastroesophageal reflux disease without esophagitis  Chronic, stable on ppi. He had protonix in chart as well, took this out. Needs EGD  - omeprazole (PRILOSEC) 40 MG delayed release capsule; TAKE 1 CAPSULE BY MOUTH DAILY  Dispense: 30 capsule; Refill: 5    10. Personal history UTI  - Culture, Urine; Future  - POCT Urinalysis No Micro (Auto), unremarkable  - Rupali - Judi Queen MD, Urology, [place]    11. Enlarged prostate  - Kojo Grace MD, Urology, [place]    12. Medication management contract agreement  - on ambien from this provider and klonopin from his psych Dr. Juan Vasquez  - Pain Management Drug Screen; Future    13. Need for hepatitis C screening test  - Hepatitis C Antibody; Future    14. Screen for colon cancer  - Fecal DNA Colorectal cancer screening (Cologuard)    15. Encounter for screening for HIV  - HIV Screen; Future            On this date 10/31/2022 I have spent 50 minutes reviewing previous notes, test results and face to face with the patient discussing the diagnosis and importance of compliance with the treatment plan as well as documenting on the day of the visit. Return in about 1 week (around 11/7/2022) for bp recheck and memory recheck.      Electronically signed by:  EVON Zamora - SOTERO   10/31/22

## 2022-10-31 NOTE — TELEPHONE ENCOUNTER
Can we also call Dr. Morrow Adjutant office and see if they were able to reach him to schedule an appt for colonoscopy/EGD d/t chronic diarrhea, weight loss and nausea on omeprazole. Referred 6 weeks ago as well, not sure if pt didn't remember or if on our end.

## 2022-10-31 NOTE — TELEPHONE ENCOUNTER
I appreciate it Laura Deep. We will just have to remember together to give him this update and scheduled appts when see him here for f/u on 11/7!

## 2022-10-31 NOTE — TELEPHONE ENCOUNTER
I called  office. They are booking out until December in [place]. I scheduled patient with Marino Yuan in [facility] on [address] at 2:30PM. I tried to call patient to make him aware of these appts but have been unable to reach him.

## 2022-10-31 NOTE — TELEPHONE ENCOUNTER
Location of patient: [address] call from Oren Carolina at [facility] with [employer]. Subjective: Caller states \"having seizures and buzzing sensation in head for a couple of weeks\"     Current Symptoms: started every once in a while with what he thought was vertigo. It will be without warning, head will \"buzz\", like a drunk feeling but without being drunk. Limbs will get weak and heavy. Happened the other day after getting out of the shower. Laid down after that. If laying down for a while it will pass,  He is aware of what is going on the whole time. No history of seizures. Saturday happened a lot  Yesterday was fine. Last time episodes occurred was Saturday. No illness    Onset: couple of weeks   ago; intermittent    Associated Symptoms: NA    Pain Severity:  denies    Temperature: denies     What has been tried: will lay down    LMP: NA Pregnant: NA    Recommended disposition: See in Office Today or Tomorrow    Care advice provided, patient verbalizes understanding; denies any other questions or concerns; instructed to call back for any new or worsening symptoms. Patient/Caller agrees with recommended disposition; writer provided warm transfer to Gris Braxton at [facility] for appointment scheduling    Attention Provider: Thank you for allowing me to participate in the care of your patient. The patient was connected to triage in response to information provided to the ECC/PSC. Please do not respond through this encounter as the response is not directed to a shared pool.     Reason for Disposition   Patient wants to be seen    Protocols used: [id number]

## 2022-10-31 NOTE — TELEPHONE ENCOUNTER
I called [facility] and scheduled patient's stress test. Stress test will be on 11/9/22 with a 7:15AM arrival time. Patient needs to be NPO six hours prior and without caffeine for at least 12 hours prior. This will be done at [address] [place] [address]. [facility]. Entrance B for outpatient testing.        Per Jeffrey Grass will need to tell patient what medication he can take the day of the exam and what medication the patient cannot take the day of the exam.

## 2022-11-01 LAB
HEPATITIS C ANTIBODY: NONREACTIVE
HIV AG/AB: NONREACTIVE

## 2022-11-02 LAB
ALCOHOL URINE: NEGATIVE
URINE CULTURE, ROUTINE: NORMAL

## 2022-11-03 ENCOUNTER — TELEPHONE (OUTPATIENT)
Dept: INTERNAL MEDICINE | Age: 52
End: 2022-11-03

## 2022-11-03 DIAGNOSIS — E87.6 HYPOKALEMIA: ICD-10-CM

## 2022-11-03 DIAGNOSIS — D64.9 ANEMIA, UNSPECIFIED TYPE: ICD-10-CM

## 2022-11-03 RX ORDER — POTASSIUM CHLORIDE 750 MG/1
10 TABLET, EXTENDED RELEASE ORAL 2 TIMES DAILY
Qty: 14 TABLET | Refills: 0 | Status: SHIPPED | OUTPATIENT
Start: 2022-11-03 | End: 2022-11-10

## 2022-11-03 NOTE — TELEPHONE ENCOUNTER
Please let pt know his potassium level is a little bit low and he is anemic as well. I have sent in a potassium to take twice a day for a week. I want to see him back Monday as planned and will do some more labs to follow up on the anemia and potassium.

## 2022-11-04 LAB
6-ACETYLMORPHINE: NOT DETECTED
7-AMINOCLONAZEPAM: PRESENT
ALPHA-OH-ALPRAZOLAM: NOT DETECTED
ALPHA-OH-MIDAZOLAM, URINE: NOT DETECTED
ALPRAZOLAM: NOT DETECTED
AMPHETAMINE: NOT DETECTED
BARBITURATES: NOT DETECTED
BENZOYLECGONINE: NOT DETECTED
BUPRENORPHINE: NOT DETECTED
CARISOPRODOL: NOT DETECTED
CLONAZEPAM: NOT DETECTED
CODEINE: NOT DETECTED
CREATININE URINE: 70.5 MG/DL (ref 20–400)
DIAZEPAM: NOT DETECTED
EER PAIN MGT DRUG PANEL, HIGH RES/EMIT U: NORMAL
ETHYL GLUCURONIDE: NOT DETECTED
FENTANYL: NOT DETECTED
GABAPENTIN: NOT DETECTED
HYDROCODONE: NOT DETECTED
HYDROMORPHONE: NOT DETECTED
LORAZEPAM: PRESENT
MARIJUANA METABOLITE: PRESENT
MDA: NOT DETECTED
MDEA: NOT DETECTED
MDMA URINE: NOT DETECTED
MEPERIDINE: NOT DETECTED
METHADONE: NOT DETECTED
METHAMPHETAMINE: NOT DETECTED
METHYLPHENIDATE: NOT DETECTED
MIDAZOLAM: NOT DETECTED
MORPHINE: NOT DETECTED
NALOXONE: NOT DETECTED
NORBUPRENORPHINE, FREE: NOT DETECTED
NORDIAZEPAM: NOT DETECTED
NORFENTANYL: NOT DETECTED
NORHYDROCODONE, URINE: NOT DETECTED
NOROXYCODONE: NOT DETECTED
NOROXYMORPHONE, URINE: NOT DETECTED
OXAZEPAM: NOT DETECTED
OXYCODONE: NOT DETECTED
OXYMORPHONE: NOT DETECTED
PAIN MANAGEMENT DRUG PANEL: NORMAL
PCP: NOT DETECTED
PHENTERMINE: NOT DETECTED
PREGABALIN: NOT DETECTED
TAPENTADOL, URINE: NOT DETECTED
TAPENTADOL-O-SULFATE, URINE: NOT DETECTED
TEMAZEPAM: NOT DETECTED
TRAMADOL: NOT DETECTED
ZOLPIDEM: NOT DETECTED

## 2022-11-04 NOTE — TELEPHONE ENCOUNTER
I called patient and made him aware. He is also now aware of all his upcoming appts. He wanted to let you know that he called his psychiatrist Elliot Brady and made her aware of his recent health issues and wanted to make sure it was not from the medication he is taking.

## 2022-11-04 NOTE — TELEPHONE ENCOUNTER
I spoke to the pt he is aware. He wanted to let you know that last night he had one of his spells and fainted.

## 2022-11-07 ENCOUNTER — OFFICE VISIT (OUTPATIENT)
Dept: INTERNAL MEDICINE | Age: 52
End: 2022-11-07
Payer: COMMERCIAL

## 2022-11-07 VITALS
HEIGHT: 70 IN | RESPIRATION RATE: 16 BRPM | OXYGEN SATURATION: 99 % | DIASTOLIC BLOOD PRESSURE: 78 MMHG | SYSTOLIC BLOOD PRESSURE: 110 MMHG | HEART RATE: 64 BPM | BODY MASS INDEX: 25.83 KG/M2 | WEIGHT: 180.4 LBS

## 2022-11-07 DIAGNOSIS — E87.6 HYPOKALEMIA: ICD-10-CM

## 2022-11-07 DIAGNOSIS — D52.8 OTHER FOLATE DEFICIENCY ANEMIAS: ICD-10-CM

## 2022-11-07 DIAGNOSIS — R41.3 MEMORY CHANGE: ICD-10-CM

## 2022-11-07 DIAGNOSIS — F13.10 BENZODIAZEPINE ABUSE (HCC): Primary | ICD-10-CM

## 2022-11-07 DIAGNOSIS — R68.89: ICD-10-CM

## 2022-11-07 DIAGNOSIS — D64.9 ANEMIA, UNSPECIFIED TYPE: ICD-10-CM

## 2022-11-07 DIAGNOSIS — R53.1 WEAKNESS: ICD-10-CM

## 2022-11-07 DIAGNOSIS — I95.9 HYPOTENSION, UNSPECIFIED HYPOTENSION TYPE: ICD-10-CM

## 2022-11-07 DIAGNOSIS — R63.4 WEIGHT LOSS: ICD-10-CM

## 2022-11-07 DIAGNOSIS — Z91.14 MEDICATION MANAGEMENT CONTRACT BROKEN: ICD-10-CM

## 2022-11-07 DIAGNOSIS — R82.5 POSITIVE URINE DRUG SCREEN: ICD-10-CM

## 2022-11-07 PROBLEM — Z91.148 MEDICATION MANAGEMENT CONTRACT BROKEN: Status: ACTIVE | Noted: 2022-10-31

## 2022-11-07 LAB
BASOPHILS ABSOLUTE: 0 K/UL (ref 0–0.2)
BASOPHILS RELATIVE PERCENT: 0.2 %
EOSINOPHILS ABSOLUTE: 0.1 K/UL (ref 0–0.7)
EOSINOPHILS RELATIVE PERCENT: 1.7 %
HCT VFR BLD CALC: 32.1 % (ref 42–52)
HEMOGLOBIN: 10.7 G/DL (ref 14–18)
LYMPHOCYTES ABSOLUTE: 1.1 K/UL (ref 1–4.8)
LYMPHOCYTES RELATIVE PERCENT: 16.9 %
MCH RBC QN AUTO: 31.2 PG (ref 27–31.3)
MCHC RBC AUTO-ENTMCNC: 33.3 % (ref 33–37)
MCV RBC AUTO: 93.7 FL (ref 79–92.2)
MONOCYTES ABSOLUTE: 0.2 K/UL (ref 0.2–0.8)
MONOCYTES RELATIVE PERCENT: 3.3 %
NEUTROPHILS ABSOLUTE: 5.2 K/UL (ref 1.4–6.5)
NEUTROPHILS RELATIVE PERCENT: 77.9 %
PDW BLD-RTO: 17.5 % (ref 11.5–14.5)
PLATELET # BLD: 244 K/UL (ref 130–400)
POTASSIUM SERPL-SCNC: 3.3 MEQ/L (ref 3.4–4.9)
RBC # BLD: 3.42 M/UL (ref 4.7–6.1)
WBC # BLD: 6.7 K/UL (ref 4.8–10.8)

## 2022-11-07 PROCEDURE — 3078F DIAST BP <80 MM HG: CPT | Performed by: NURSE PRACTITIONER

## 2022-11-07 PROCEDURE — 99214 OFFICE O/P EST MOD 30 MIN: CPT | Performed by: NURSE PRACTITIONER

## 2022-11-07 PROCEDURE — 3074F SYST BP LT 130 MM HG: CPT | Performed by: NURSE PRACTITIONER

## 2022-11-07 RX ORDER — BUDESONIDE AND FORMOTEROL FUMARATE DIHYDRATE 160; 4.5 UG/1; UG/1
2 AEROSOL RESPIRATORY (INHALATION) 2 TIMES DAILY
COMMUNITY

## 2022-11-07 RX ORDER — OLANZAPINE 15 MG/1
TABLET ORAL
COMMUNITY
Start: 2022-10-31

## 2022-11-07 ASSESSMENT — ENCOUNTER SYMPTOMS
COUGH: 1
BLOOD IN STOOL: 0
SHORTNESS OF BREATH: 1

## 2022-11-07 NOTE — PROGRESS NOTES
[address] [id number] [facility] PRIMARY CARE  [address] [facility] [address]  Dept: [phone]  Dept Fax: [phone]: Kerri Billingsley (: 1970) is a 50 y.o. male, Established patient, here for evaluation of the following chief complaint(s):  Discuss Labs and Fatigue (Continued to have episodes of leg weakness. Feels like his muscles lose their strength. Has not passed out.)      PCP:  EVON Pastor - CNP      Pt here for 1 week f/u for weakness and passing out. Hasn't passed out since Friday. Did start potassium supplement on Saturday d/t being found to have low potassium here. He states when he passed out, he got mad with feeling the weakness/pass out feeling and \"I was able to push through it instead of resting\". This is when he passed out. His wife and daughter were right there, states was \"more of a faint than a pass out, lost maybe 5-10 seconds\". Didn't strike head. He is scheduled for GI referral tomorrow to evaluate need for colonoscopy given that he is due for screening, but battles chronic diarrhea, new weight loss and new anemia. 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 The orientation starts tomorrow. He will call and reschedule. Takes klonopin for his anxiety from Dr. Robin Sargent. On ambien for insomnia this month- often asks to switch back and forth between [place]. He did urine drug screen d/t his symptoms last week which was positive for klonopin metabolites, ativan, and marijuana. Asked him where he is getting [place] [address] [facility] in last 2 yrs for ativan was 10/15/21 got 9 tabs in the ER and he has been advised on multiple occasions not to take more benzos- in past he wanted restoril as had px for that along with his klonopin per his report prior to establishing with me couple of years ago. His OARRS shows over 11 providers filling sedatives, anxiety meds and pain meds in last 2 yrs. End of 2020, he was on edible marijuana gummies. Had long discussion about risks of abuse, overuse of 2 different benzos and advised that he is not to violate his med contract. Aware could not be on marijuana which is why he originally quit the medical marijuana because he wanted klonopin and was advised would not prescribe if he was on both- was referred to psych for management, Dr. Fabby Melissa. He says he had ativan from an old px that he has been taking since he can't sleep despite being on [place]. He also tested positive for marijuana. Since has broken his contract, will no longer prescribe any controlled meds- including sleeping meds ambien and lunesta. He is made aware of this today and he says didn't think was a big deal. Denies getting ativan illegally off streets, had concern could be laced with something causing some of his neurological symptoms. Review of Systems   Constitutional:  Positive for fatigue. Respiratory:  Positive for cough and shortness of breath. Cardiovascular: Negative. Negative for chest pain, palpitations and leg swelling. Gastrointestinal:  Negative for blood in stool. Neurological:  Positive for syncope, weakness and light-headedness. Negative for seizures. Psychiatric/Behavioral:  Positive for sleep disturbance. Negative for dysphoric mood. The patient is not nervous/anxious.       Past Medical History:   Diagnosis Date    Acute low back pain 7/11/2017    Anxiety     Chronic back pain     Degeneration of intervertebral disc of lumbar region     Fracture, tibial plateau, right, closed, initial encounter 4/8/2022    Gastroesophageal reflux disease     Hypertension     Insomnia     Major depressive disorder     Restless leg 6/12/2017     Past Surgical History:   Procedure Laterality Date    APPENDECTOMY      CHOLECYSTECTOMY      TONSILLECTOMY       Social History     Socioeconomic History    Marital status:      Spouse name: Not on file    Number of children: Not on file    Years of education: Not on file    Highest education level: Not on file   Occupational History    Not on file   Tobacco Use    Smoking status: Former     Packs/day: 1.00     Years: 13.00     Pack years: 13.00     Types: Cigarettes     Start date:      Quit date:      Years since quittin.8    Smokeless tobacco: Never   Substance and Sexual Activity    Alcohol use: Not Currently     Comment: occasional    Drug use: No    Sexual activity: Not on file   Other Topics Concern    Not on file   Social History Narrative    Not on file     Social Determinants of Health     Financial Resource Strain: Low Risk     Difficulty of Paying Living Expenses: Not hard at all   Food Insecurity: No Food Insecurity    Worried About Running Out of Food in the Last Year: Never true    Ran Out of Food in the Last Year: Never true   Transportation Needs: Not on file   Physical Activity: Not on file   Stress: Not on file   Social Connections: Not on file   Intimate Partner Violence: Not on file   Housing Stability: Not on file     Family History   Problem Relation Age of Onset    Heart Disease Father     Other Father         heart bypass     Cancer Maternal Grandmother         stomach    Heart Attack Paternal Grandfather       Allergies   Allergen Reactions    Acetaminophen Shortness Of Breath    Bee Venom Anaphylaxis    Hydrocortisone Anaphylaxis     Other reaction(s): Unknown     Prior to Admission medications    Medication Sig Start Date End Date Taking?  Authorizing Provider   budesonide-formoterol (SYMBICORT) 160-4.5 MCG/ACT AERO Inhale 2 puffs into the lungs 2 times daily   Yes Historical Provider, MD   potassium chloride (KLOR-CON M) 10 MEQ extended release tablet Take 1 tablet by mouth 2 times daily for 7 days 11/3/22 11/10/22 Yes EVON Genao CNP   omeprazole (PRILOSEC) 40 MG delayed release capsule TAKE 1 CAPSULE BY MOUTH DAILY 10/31/22  Yes Willow Sacks, APRN - CNP   albuterol sulfate (PROAIR RESPICLICK) 224 (90 Base) MCG/ACT aerosol powder inhalation Inhale 1 puff into the lungs every 4 hours as needed for Wheezing or Shortness of Breath 10/17/22  Yes Willow Sacks, APRN - CNP   mirtazapine (REMERON) 7.5 MG tablet TAKE 1 TABLET BY MOUTH DAILY AT BEDTIME 10/3/22  Yes Historical Provider, MD   FLUoxetine (PROZAC) 20 MG capsule TAKE 1 CAPSULE BY MOUTH EVERY DAY 10/14/22  Yes Historical Provider, MD   [place] ([address] [place]) CREA  9/12/22  Yes Historical Provider, MD   tamsulosin (FLOMAX) 0.4 MG capsule TAKE 1 CAPSULE BY MOUTH DAILY 10/17/22  Yes Willow Sacks, APRN - CNP   DULoxetine (CYMBALTA) 60 MG extended release capsule Take 60 mg by mouth 8/16/21  Yes Historical Provider, MD   naloxone 4 MG/0.1ML LIQD nasal spray 4 mg by Nasal route 8/16/21  Yes Historical Provider, MD   OLANZapine (ZYPREXA) 5 MG tablet TAKE 1 TABLET BY MOUTH AT BEDTIME 9/3/22  Yes Historical Provider, MD   clonazePAM (KLONOPIN) 1 MG tablet Take 1 tablet by mouth 2 times daily as needed for Anxiety . 11/10/17  Yes EVON Hanks CNP   EPINEPHrine (EPIPEN) 0.3 MG/0.3ML SOAJ injection Inject 0.3 mLs into the muscle as needed (allergic reaction) Use as directed for allergic reaction 8/7/17  Yes EVON Hanks CNP   propranolol (INDERAL LA) 80 MG extended release capsule Take 1 capsule by mouth daily 8/7/17  Yes EVON Hanks CNP   OLANZapine (ZYPREXA) 15 MG tablet TAKE 1 TABLET BY MOUTH AT BEDTIME 10/31/22   Historical Provider, MD                I have reviewed the patient's medical history in detail and updated the computerized patient record.       OBJECTIVE    Vitals:    11/07/22 1317   BP: 110/78   Site: Left Upper Arm   Position: Sitting   Cuff Size: Medium Adult   Pulse: 64   Resp: 16   SpO2: 99%   Weight: 180 lb 6.4 oz (81.8 kg)   Height: 5' 9.69\" (1.77 m)       Physical Exam  Vitals and nursing note reviewed. Constitutional:       General: He is not in acute distress. Appearance: He is well-developed. Comments: Chronically ill appearing     HENT:      Head: Normocephalic and atraumatic. Neck:      Thyroid: No thyromegaly. Cardiovascular:      Rate and Rhythm: Normal rate and regular rhythm. Heart sounds: Normal heart sounds. No murmur heard. Pulmonary:      Effort: Pulmonary effort is normal. No respiratory distress. Breath sounds: Normal breath sounds. No wheezing. Musculoskeletal:      Cervical back: Normal range of motion. Lymphadenopathy:      Cervical: No cervical adenopathy. Skin:     General: Skin is warm and dry. Neurological:      Mental Status: He is alert. Mental status is at baseline. Cranial Nerves: Facial asymmetry present. Motor: Weakness present. Gait: Gait abnormal (shuffling). Psychiatric:         Mood and Affect: Mood normal. Affect is flat. Speech: Speech is slurred (slightly, unchanged from previous 2 visits). Behavior: Behavior is slowed. Behavior is cooperative. Cognition and Memory: He exhibits impaired recent memory. Judgment: Judgment is inappropriate. ASSESSMENT/ PLAN    1. Benzodiazepine abuse ([facility] [address]  -new finding on urine drug screen, + for klonopin and ativan. Had 9 tabs of ativan from ER over a yr ago that he claims has been taking for sleep issues. -long discussion about the health risks of overdosing on benzos and that many of his symptoms he has been presenting with could be attributed to taking both these drugs regularly. He states is not and denies getting ativan from the streets, says is from old px  -advised this is in violation of med contract for his sleeping meds from me and I am canceling them at this time.  Will not receive any further controlled meds of any kind from this office.   -suggest he stop using both these drugs immediately  -suggest he start a rehab program, but he doesn't feel has a problem    2. Hypokalemia  New finding on recent labs. Was started on oral potassium supplement 3 days ago, has had 2 doses. -recheck today  - Potassium; Future    3. Anemia, unspecified type  New finding on recent labs, check repeat cbc and add vit b12 and folate. Plan on supplementing to correct  - Vitamin B12 & Folate; Future  - CBC with Auto Differential; Future    4. Weakness  Chronic, worsening issue. Neurological tests ordered last week, however given recent drug screen question if benzo abuse/overuse is main culprit here. Still concern for underlying other issue, so working that up    5. Feeling of being drunk  -ongoing, but better this week since getting low bp under control. Etoh urine level was negative, but urine drug screen + for klonopin, ativan and marijuana    6. Positive urine drug screen  Violates contract, see above    7. Medication management contract broken  No controlled meds to be given from this provider again. He is made aware of this today    8. Weight loss  -sudden in recent months. Referral to gi made, he is scheduled for tomorrow but reports needs to reschedule d/t time conflict with orientation for a new job. Enc to reschedule, do not no show or will not be seen in future there    9. Memory change  -new finding at last visit. Suspect is from benzo abuse and other med and drug abuse. Will watch closely. -he is heavily medicated for sleep, well was now stopping ambien. He is on remeron and zyprexa from psych as well. 10. Hypotension  -acute finding last week, held losartan and bp is back in normal range today. Discontinue med at this time. Stress test is scheduled for later this week and also referral to urology for urinary retention and prostate suspected issues. Return in about 1 month (around 12/7/2022) for bp and neuro recheck.      Electronically signed by:  EVON Madden - SOTERO   11/7/22

## 2022-11-08 LAB
FOLATE: <2 NG/ML
VITAMIN B-12: 309 PG/ML (ref 232–1245)

## 2022-11-09 PROBLEM — D52.8 OTHER FOLATE DEFICIENCY ANEMIAS: Status: ACTIVE | Noted: 2022-11-03

## 2022-11-09 RX ORDER — POTASSIUM CHLORIDE 750 MG/1
10 TABLET, EXTENDED RELEASE ORAL 2 TIMES DAILY
Qty: 60 TABLET | Refills: 0 | Status: SHIPPED | OUTPATIENT
Start: 2022-11-09

## 2022-11-09 RX ORDER — FOLIC ACID 1 MG/1
1 TABLET ORAL DAILY
Qty: 90 TABLET | Refills: 1 | Status: SHIPPED | OUTPATIENT
Start: 2022-11-09

## 2022-11-09 NOTE — PROGRESS NOTES
Please let pt know his anemia appears to be caused from folate deficiency (vitamin). I have sent px for this to start asap. I also sent in another month of potassium that I want him to continue on. I want to see him in 1 month to recheck potassium levels. His anemia actually got worse in just a week- the vitamin will help but will take at least 2 months to rebuild the cells. He needs to reschedule with GI if hasn't already (he was cancelling Tuesdays appt) to make sure not having a gi bleed somewhere as well, causing the anemia.

## 2022-11-29 ENCOUNTER — TELEPHONE (OUTPATIENT)
Dept: INTERNAL MEDICINE | Age: 52
End: 2022-11-29

## 2022-11-30 ENCOUNTER — TELEPHONE (OUTPATIENT)
Dept: INTERNAL MEDICINE | Age: 52
End: 2022-11-30

## 2022-11-30 NOTE — TELEPHONE ENCOUNTER
Kevin from [facility] called RE: the referral for patient. She stated she has tried calling with no success and he has no voicemail set up.     Thank you

## 2022-11-30 NOTE — TELEPHONE ENCOUNTER
[facility]. He had said hadn't heard from them when I last saw him and decided to switch to [facility].  He missed that appt initially but I see he is scheduled there early [place]

## 2022-12-05 ENCOUNTER — TELEPHONE (OUTPATIENT)
Dept: INTERNAL MEDICINE | Age: 52
End: 2022-12-05

## 2023-04-20 ENCOUNTER — TELEPHONE (OUTPATIENT)
Dept: INTERNAL MEDICINE | Age: 53
End: 2023-04-20

## 2024-12-12 PROCEDURE — 93306 TTE W/DOPPLER COMPLETE: CPT | Performed by: INTERNAL MEDICINE
